# Patient Record
Sex: MALE | Race: WHITE
[De-identification: names, ages, dates, MRNs, and addresses within clinical notes are randomized per-mention and may not be internally consistent; named-entity substitution may affect disease eponyms.]

---

## 2017-06-11 ENCOUNTER — HOSPITAL ENCOUNTER (EMERGENCY)
Dept: HOSPITAL 47 - EC | Age: 22
Discharge: HOME | End: 2017-06-11
Payer: COMMERCIAL

## 2017-06-11 VITALS
RESPIRATION RATE: 18 BRPM | TEMPERATURE: 97.6 F | SYSTOLIC BLOOD PRESSURE: 133 MMHG | HEART RATE: 93 BPM | DIASTOLIC BLOOD PRESSURE: 81 MMHG

## 2017-06-11 DIAGNOSIS — F17.200: ICD-10-CM

## 2017-06-11 DIAGNOSIS — R07.89: Primary | ICD-10-CM

## 2017-06-11 PROCEDURE — 99285 EMERGENCY DEPT VISIT HI MDM: CPT

## 2017-06-11 PROCEDURE — 93005 ELECTROCARDIOGRAM TRACING: CPT

## 2017-06-11 PROCEDURE — 71020: CPT

## 2017-06-11 NOTE — ED
General Adult HPI





- General


Chief complaint: Chest Pain


Stated complaint: chest pain 3 days


Time Seen by Provider: 06/11/17 12:57


Source: patient, RN notes reviewed


Mode of arrival: ambulatory


Limitations: no limitations





- History of Present Illness


Initial comments: 





Patient is a 21-year-old male who presents emergency room today with a chief 

complaint of chest pain 3 days.  He doesn't that over the last 3 days she's 

been experiencing a constant "achy" type pain to the left side of the chest 

wall.  He states it's a 4/10.  He states it does seem to be worse certain 

movements at times the left shoulder.  He denies any other complaints.  Denies 

any injury or trauma.  States never had similar symptoms in the past. Patient 

denies any recent fever, chills, shortness of breath, back pain, abdominal pain

, nausea or vomiting, numbness or tingling, dysuria or hematuria, constipation 

or diarrhea, headaches or visual changes, or any other complaints.





- Related Data


 Previous Rx's











 Medication  Instructions  Recorded


 


Ibuprofen [Motrin] 600 mg PO Q6HR PRN #40 day 06/11/17











 Allergies











Allergy/AdvReac Type Severity Reaction Status Date / Time


 


No Known Allergies Allergy   Verified 06/11/17 13:11














Review of Systems


ROS Statement: 


Those systems with pertinent positive or pertinent negative responses have been 

documented in the HPI.





ROS Other: All systems not noted in ROS Statement are negative.





Past Medical History


Past Medical History: No Reported History


History of Any Multi-Drug Resistant Organisms: None Reported


Past Surgical History: No Surgical Hx Reported


Past Psychological History: No Psychological Hx Reported


Smoking Status: Current every day smoker


Past Alcohol Use History: None Reported


Past Drug Use History: Marijuana





General Exam





- General Exam Comments


Initial Comments: 





General:  The patient is awake and alert, in no distress, and does not appear 

acutely ill. 


Eye:  Pupils are equal, round and reactive to light, extra-ocular movements are 

intact.  No nystagmus.  There is normal conjunctiva bilaterally.  No signs of 

icterus.  


Ears, nose, mouth and throat:  There are moist mucous membranes and no oral 

lesions. 


Neck:  The neck is supple, there is no tenderness or JVD.  


Cardiovascular:  There is a regular rate and rhythm. No murmur, rub or gallop 

is appreciated.  Tender palpation to the left side of the anterior chest all 

towards the axilla. 


Respiratory:  Lungs are clear to auscultation, respirations are non-labored, 

breath sounds are equal.  No wheezes, stridor, rales, or rhonchi.


Gastrointestinal:  Soft, non-distended, non-tender abdomen without masses or 

organomegaly noted. There is no rebound or guarding present.  No CVA 

tenderness. Bowel sounds are unremarkable.


Musculoskeletal:  Normal ROM, no tenderness.  Strength 5/5. Sensation intact. 

Pulses equal bilaterally 2+.  


Neurological:  A&O x 3. CN II-XII intact, There are no obvious motor or sensory 

deficits. Coordination appears grossly intact. Speech is normal.


Skin:  Skin is warm and dry and no rashes or lesions are noted. 


Psychiatric:  Cooperative, appropriate mood & affect, normal judgment.  


Limitations: no limitations





Course


 Vital Signs











  06/11/17 06/11/17





  12:43 12:50


 


Temperature 97.6 F 


 


Pulse Rate 93 


 


Respiratory 18 18





Rate  


 


Blood Pressure 133/81 


 


O2 Sat by Pulse 99 





Oximetry  














EKG Findings





- EKG Comments:


EKG Findings:: EKG performed at 1252:  A 12-lead EKG was performed and 

interpreted by me as showing the following: Rate is 72, and rhythm is normal 

sinus. There are normal QRS complexes and normal R-wave progression. ST 

segments have no elevation or depression, and MN segments appear normal.





Medical Decision Making





- Medical Decision Making





Patient reexamined at this time shows no signs of distress.  Patient chest x-

ray reviewed and is negative for any acute abnormalities.  His EKG shows normal 

sinus rhythm.  His pain is on palpation anterior chest wall.  Patient is 

advised to limit his physical activity follow-up family doctor over the next 2 

days.  States does have family doctor is unsure of his name.  Will make an 

appointment.  Patient advised to use anti-inflammatories for pain.  Advised 

return to emergency room symptoms increase or worsen.  States and is in 

agreement.





Disposition


Clinical Impression: 


 Chest wall pain





Disposition: HOME SELF-CARE


Condition: Good


Instructions:  Costochondritis (ED)


Additional Instructions: 


Please use medication as discussed.  Please limit physical activity until follow

-up family doctor.  Please follow-up with family doctor in the next 2 days.  

Please return to emergency room if the symptoms increase or worsen or for any 

other concerns.


Prescriptions: 


Ibuprofen [Motrin] 600 mg PO Q6HR PRN #40 day


 PRN Reason: Pain


Referrals: 


None,Stated [Primary Care Provider] - 1-2 days


Time of Disposition: 13:46

## 2017-06-11 NOTE — XR
EXAMINATION TYPE: XR chest 2V

 

DATE OF EXAM: 6/11/2017

 

COMPARISON: NONE

 

HISTORY: Chest pain

 

TECHNIQUE:  Frontal and lateral views of the chest are obtained.

 

FINDINGS:  Heart and mediastinum are normal. Lungs are clear. Diaphragm is normal bony thorax appears
 normal.

 

IMPRESSION:  Normal chest

## 2017-06-28 ENCOUNTER — HOSPITAL ENCOUNTER (INPATIENT)
Dept: HOSPITAL 47 - EC | Age: 22
LOS: 1 days | Discharge: HOME | DRG: 194 | End: 2017-06-29
Attending: HOSPITALIST | Admitting: HOSPITALIST
Payer: COMMERCIAL

## 2017-06-28 DIAGNOSIS — R09.1: Primary | ICD-10-CM

## 2017-06-28 DIAGNOSIS — F12.90: ICD-10-CM

## 2017-06-28 DIAGNOSIS — J45.909: ICD-10-CM

## 2017-06-28 DIAGNOSIS — F17.210: ICD-10-CM

## 2017-06-28 DIAGNOSIS — I31.9: ICD-10-CM

## 2017-06-28 LAB
ALP SERPL-CCNC: 66 U/L (ref 38–126)
ALT SERPL-CCNC: 28 U/L (ref 21–72)
ANION GAP SERPL CALC-SCNC: 11 MMOL/L
AST SERPL-CCNC: 28 U/L (ref 17–59)
BASOPHILS # BLD AUTO: 0 K/UL (ref 0–0.2)
BASOPHILS NFR BLD AUTO: 0 %
BUN SERPL-SCNC: 13 MG/DL (ref 9–20)
CALCIUM SPEC-MCNC: 10.2 MG/DL (ref 8.4–10.2)
CH: 32.8
CHCM: 35
CHLORIDE SERPL-SCNC: 106 MMOL/L (ref 98–107)
CK SERPL-CCNC: 86 U/L (ref 55–170)
CO2 SERPL-SCNC: 27 MMOL/L (ref 22–30)
EOSINOPHIL # BLD AUTO: 0.1 K/UL (ref 0–0.7)
EOSINOPHIL NFR BLD AUTO: 2 %
ERYTHROCYTE [DISTWIDTH] IN BLOOD BY AUTOMATED COUNT: 5.01 M/UL (ref 4.3–5.9)
ERYTHROCYTE [DISTWIDTH] IN BLOOD: 12.5 % (ref 11.5–15.5)
GLUCOSE SERPL-MCNC: 76 MG/DL (ref 74–99)
HCT VFR BLD AUTO: 47.1 % (ref 39–53)
HDW: 2.35
HGB BLD-MCNC: 16.2 GM/DL (ref 13–17.5)
LUC NFR BLD AUTO: 2 %
LYMPHOCYTES # SPEC AUTO: 2.1 K/UL (ref 1–4.8)
LYMPHOCYTES NFR SPEC AUTO: 36 %
MCH RBC QN AUTO: 32.2 PG (ref 25–35)
MCHC RBC AUTO-ENTMCNC: 34.3 G/DL (ref 31–37)
MCV RBC AUTO: 93.9 FL (ref 80–100)
MONOCYTES # BLD AUTO: 0.4 K/UL (ref 0–1)
MONOCYTES NFR BLD AUTO: 6 %
NEUTROPHILS # BLD AUTO: 3.2 K/UL (ref 1.3–7.7)
NEUTROPHILS NFR BLD AUTO: 54 %
NON-AFRICAN AMERICAN GFR(MDRD): >60
POTASSIUM SERPL-SCNC: 4.3 MMOL/L (ref 3.5–5.1)
PROT SERPL-MCNC: 7.7 G/DL (ref 6.3–8.2)
SODIUM SERPL-SCNC: 144 MMOL/L (ref 137–145)
TROPONIN I SERPL-MCNC: <0.012 NG/ML (ref 0–0.03)
TROPONIN I SERPL-MCNC: <0.012 NG/ML (ref 0–0.03)
WBC # BLD AUTO: 0.12 10*3/UL
WBC # BLD AUTO: 5.9 K/UL (ref 4–11)
WBC (PEROX): 5.41

## 2017-06-28 PROCEDURE — 99285 EMERGENCY DEPT VISIT HI MDM: CPT

## 2017-06-28 PROCEDURE — 93005 ELECTROCARDIOGRAM TRACING: CPT

## 2017-06-28 PROCEDURE — 36415 COLL VENOUS BLD VENIPUNCTURE: CPT

## 2017-06-28 PROCEDURE — 71020: CPT

## 2017-06-28 PROCEDURE — 85025 COMPLETE CBC W/AUTO DIFF WBC: CPT

## 2017-06-28 PROCEDURE — 84484 ASSAY OF TROPONIN QUANT: CPT

## 2017-06-28 PROCEDURE — 80053 COMPREHEN METABOLIC PANEL: CPT

## 2017-06-28 PROCEDURE — 93306 TTE W/DOPPLER COMPLETE: CPT

## 2017-06-28 PROCEDURE — 85652 RBC SED RATE AUTOMATED: CPT

## 2017-06-28 PROCEDURE — 82553 CREATINE MB FRACTION: CPT

## 2017-06-28 PROCEDURE — 82550 ASSAY OF CK (CPK): CPT

## 2017-06-28 PROCEDURE — 86140 C-REACTIVE PROTEIN: CPT

## 2017-06-28 PROCEDURE — 85379 FIBRIN DEGRADATION QUANT: CPT

## 2017-06-28 RX ADMIN — CEFAZOLIN SCH: 330 INJECTION, POWDER, FOR SOLUTION INTRAMUSCULAR; INTRAVENOUS at 17:47

## 2017-06-28 NOTE — XR
EXAMINATION TYPE: XR chest 2V

 

DATE OF EXAM: 6/28/2017

 

COMPARISON: 6/11/17

 

HISTORY: Chest pain

 

TECHNIQUE:  Frontal and lateral views of the chest are obtained.

 

FINDINGS:  

 

There is no focal air space opacity.

 

No evidence for pneumothorax.  No pleural effusion.

 

The cardiac silhouette size is within normal limits.

 

The osseous structures are grossly intact.

 

IMPRESSION:  

 

1.  No acute cardiopulmonary process.

## 2017-06-28 NOTE — ED
General Adult HPI





- General


Source: patient, RN notes reviewed


Mode of arrival: ambulatory


Limitations: no limitations





<Elin Stapleton - Last Filed: 06/28/17 16:20>





<Jay Villalta - Last Filed: 06/28/17 16:26>





- General


Chief complaint: Chest Pain


Stated complaint: Chest Pain


Time Seen by Provider: 06/28/17 14:08





- History of Present Illness


Initial comments: 





20 yo male presents to the ER with cc of chest pain to the left side of the 

chest.  Patient states stabbing type pain in his head on off the past 3 weeks.  

Patient states that he does smoke meat as well as he does use cigarettes.  

Patient states that he takes a deep breath the pain is worse.  Patient states 

if he tries to think that the pain seems to improve.  Patient states he hasn't 

had any increased shortness of breath with this.  Patient denies any cough cold 

like symptoms.  Patient states he was concerned due to the pains without that 

he should be seen again.  He was seen a few weeks ago here and he told if it 

got worse to return if he states that he believes it got worse.Patient denies 

any recent fever, chills, back pain, abdominal pain, nausea vomiting, numbness 

or tingling, dysuria or hematuria, constipation or diarrhea, headaches or 

visual changes, or any other current symptoms. (Elin Stapleton)





- Related Data


 Home Medications











 Medication  Instructions  Recorded  Confirmed


 


No Known Home Medications [No  06/28/17 06/28/17





Known Home Medications]   











 Allergies











Allergy/AdvReac Type Severity Reaction Status Date / Time


 


No Known Allergies Allergy   Verified 06/28/17 14:49














Review of Systems


ROS Other: All systems not noted in ROS Statement are negative.





<Elin Stapleton - Last Filed: 06/28/17 16:20>


ROS Other: All systems not noted in ROS Statement are negative.





<Jay Villalta - Last Filed: 06/28/17 16:26>


ROS Statement: 


Those systems with pertinent positive or pertinent negative responses have been 

documented in the HPI.








Past Medical History


Past Medical History: Asthma


History of Any Multi-Drug Resistant Organisms: None Reported


Past Surgical History: No Surgical Hx Reported


Past Psychological History: No Psychological Hx Reported


Smoking Status: Current every day smoker


Past Alcohol Use History: None Reported, Occasional


Past Drug Use History: Marijuana





<Elin Stapleton - Last Filed: 06/28/17 16:20>





General Exam


Limitations: no limitations





<Elin Stapleton - Last Filed: 06/28/17 16:20>





<Jay Villalta - Last Filed: 06/28/17 16:26>





- General Exam Comments


Initial Comments: 





General:  The patient is awake and alert, in no distress, and does not appear 

acutely ill. 


Eye:  Pupils are equal, round and reactive to light, extra-ocular movements are 

intact; there is normal conjunctiva bilaterally.  No signs of icterus.  


Ears, nose, mouth and throat:  There are moist mucous membranes and no oral 

lesions. 


Neck:  The neck is supple, there is no tenderness.


Cardiovascular:  There is a regular rate and rhythm. No murmur, rub or gallop 

is appreciated.


Respiratory:  Lungs are clear to auscultation, respirations are non-labored, 

breath sounds are equal.  No wheezes, stridor, rales, or rhonchi.


Gastrointestinal:  Soft, non-distended, non-tender abdomen without masses or 

organomegaly noted. There is no rebound or guarding present.  No CVA 

tenderness. Bowel sounds are unremarkable.


Back:  There is no tenderness to palpation in the midline. There is no obvious 

deformity. No rashes noted. 


Musculoskeletal:  Normal ROM, no tenderness, There is no pedal edema. There is 

no calf tenderness or swelling. Sensation intact. Pulses equal bilaterally 2+.  


Neurological:  CN II-XII intact, There are no obvious motor or sensory 

deficits. Coordination appears grossly intact. Speech is normal.


Skin:  Skin is warm and dry and no rashes or lesions are noted. 


Psychiatric:  Cooperative, appropriate mood & affect, normal judgment.  


 (Elin Stapleton)





EKG Findings





- EKG Comments:


EKG Findings:: Markedly sinus bradycardia, ventricular rate 47, rightward axis, 

mild ST elevation noted diffusely throughout.  Concern for possible pericarditis





<Elin Stapleton - Last Filed: 06/28/17 16:20>





Medical Decision Making





- Lab Data


Result diagrams: 


 06/28/17 14:20





 06/28/17 14:20





- Radiology Data


Radiology results: report reviewed, image reviewed





<Elin Stapleton - Last Filed: 06/28/17 16:20>





- Lab Data


Result diagrams: 


 06/28/17 14:20





 06/28/17 14:20





<Jay Villalta - Last Filed: 06/28/17 16:26>





- Medical Decision Making





21-year-old male presents for left-sided chest pain that is pleuritic in 

nature.  This time lab work and imaging is reviewed.  This was concerned due to 

some elevation of the ST segments throughout the EKG when compared to EKG from 

1 week ago of concern for pericarditis.  Lab work is negative at this time.  

This time we will admit the patient to Dr. James who Dr. Villalta discussed the case 

with an order an echo cardiogram.  The patient is in agreement with the plan. (

AyadElin potter)





Is a 21-year-old male with complaint chest discomfort on again off again for a 

week.  EKG shows small mildly elevated ST segment of multiple leads suggestive 

of pericarditis.  Cardiac enzymes within normal limits.  Chest x-ray negative.  

The patient will be admitted for observation cardiology consultation 

echocardiogram ,  Dr. Villalta (Jay Villalta)





- Lab Data





 Lab Results











  06/28/17 06/28/17 06/28/17 Range/Units





  14:20 14:20 14:20 


 


WBC  5.9    (4.0-11.0)  k/uL


 


RBC  5.01    (4.30-5.90)  m/uL


 


Hgb  16.2    (13.0-17.5)  gm/dL


 


Hct  47.1    (39.0-53.0)  %


 


MCV  93.9    (80.0-100.0)  fL


 


MCH  32.2    (25.0-35.0)  pg


 


MCHC  34.3    (31.0-37.0)  g/dL


 


RDW  12.5    (11.5-15.5)  %


 


Plt Count  199    (150-450)  k/uL


 


Neutrophils %  54    %


 


Lymphocytes %  36    %


 


Monocytes %  6    %


 


Eosinophils %  2    %


 


Basophils %  0    %


 


Neutrophils #  3.2    (1.3-7.7)  k/uL


 


Lymphocytes #  2.1    (1.0-4.8)  k/uL


 


Monocytes #  0.4    (0-1.0)  k/uL


 


Eosinophils #  0.1    (0-0.7)  k/uL


 


Basophils #  0.0    (0-0.2)  k/uL


 


ESR     (0-15)  mm/hr


 


D-Dimer    <0.17  (<0.60)  mg/L FEU


 


Sodium   144   (137-145)  mmol/L


 


Potassium   4.3   (3.5-5.1)  mmol/L


 


Chloride   106   ()  mmol/L


 


Carbon Dioxide   27   (22-30)  mmol/L


 


Anion Gap   11   mmol/L


 


BUN   13   (9-20)  mg/dL


 


Creatinine   0.90   (0.66-1.25)  mg/dL


 


Est GFR (MDRD) Af Amer   >60   (>60 ml/min/1.73 sqM)  


 


Est GFR (MDRD) Non-Af   >60   (>60 ml/min/1.73 sqM)  


 


Glucose   76   (74-99)  mg/dL


 


Calcium   10.2   (8.4-10.2)  mg/dL


 


Total Bilirubin   0.7   (0.2-1.3)  mg/dL


 


AST   28   (17-59)  U/L


 


ALT   28   (21-72)  U/L


 


Alkaline Phosphatase   66   ()  U/L


 


Creatine Kinase     ()  U/L


 


CK-MB (CK-2)     (0.0-2.4)  ng/mL


 


Troponin I     (0.000-0.034)  ng/mL


 


C-Reactive Protein     (<10.0)  mg/L


 


Total Protein   7.7   (6.3-8.2)  g/dL


 


Albumin   5.0   (3.5-5.0)  g/dL














  06/28/17 06/28/17 06/28/17 Range/Units





  14:20 14:20 14:20 


 


WBC     (4.0-11.0)  k/uL


 


RBC     (4.30-5.90)  m/uL


 


Hgb     (13.0-17.5)  gm/dL


 


Hct     (39.0-53.0)  %


 


MCV     (80.0-100.0)  fL


 


MCH     (25.0-35.0)  pg


 


MCHC     (31.0-37.0)  g/dL


 


RDW     (11.5-15.5)  %


 


Plt Count     (150-450)  k/uL


 


Neutrophils %     %


 


Lymphocytes %     %


 


Monocytes %     %


 


Eosinophils %     %


 


Basophils %     %


 


Neutrophils #     (1.3-7.7)  k/uL


 


Lymphocytes #     (1.0-4.8)  k/uL


 


Monocytes #     (0-1.0)  k/uL


 


Eosinophils #     (0-0.7)  k/uL


 


Basophils #     (0-0.2)  k/uL


 


ESR    2  (0-15)  mm/hr


 


D-Dimer     (<0.60)  mg/L FEU


 


Sodium     (137-145)  mmol/L


 


Potassium     (3.5-5.1)  mmol/L


 


Chloride     ()  mmol/L


 


Carbon Dioxide     (22-30)  mmol/L


 


Anion Gap     mmol/L


 


BUN     (9-20)  mg/dL


 


Creatinine     (0.66-1.25)  mg/dL


 


Est GFR (MDRD) Af Amer     (>60 ml/min/1.73 sqM)  


 


Est GFR (MDRD) Non-Af     (>60 ml/min/1.73 sqM)  


 


Glucose     (74-99)  mg/dL


 


Calcium     (8.4-10.2)  mg/dL


 


Total Bilirubin     (0.2-1.3)  mg/dL


 


AST     (17-59)  U/L


 


ALT     (21-72)  U/L


 


Alkaline Phosphatase     ()  U/L


 


Creatine Kinase     ()  U/L


 


CK-MB (CK-2)   1.5   (0.0-2.4)  ng/mL


 


Troponin I   <0.012   (0.000-0.034)  ng/mL


 


C-Reactive Protein  <5.0    (<10.0)  mg/L


 


Total Protein     (6.3-8.2)  g/dL


 


Albumin     (3.5-5.0)  g/dL














  06/28/17 Range/Units





  14:20 


 


WBC   (4.0-11.0)  k/uL


 


RBC   (4.30-5.90)  m/uL


 


Hgb   (13.0-17.5)  gm/dL


 


Hct   (39.0-53.0)  %


 


MCV   (80.0-100.0)  fL


 


MCH   (25.0-35.0)  pg


 


MCHC   (31.0-37.0)  g/dL


 


RDW   (11.5-15.5)  %


 


Plt Count   (150-450)  k/uL


 


Neutrophils %   %


 


Lymphocytes %   %


 


Monocytes %   %


 


Eosinophils %   %


 


Basophils %   %


 


Neutrophils #   (1.3-7.7)  k/uL


 


Lymphocytes #   (1.0-4.8)  k/uL


 


Monocytes #   (0-1.0)  k/uL


 


Eosinophils #   (0-0.7)  k/uL


 


Basophils #   (0-0.2)  k/uL


 


ESR   (0-15)  mm/hr


 


D-Dimer   (<0.60)  mg/L FEU


 


Sodium   (137-145)  mmol/L


 


Potassium   (3.5-5.1)  mmol/L


 


Chloride   ()  mmol/L


 


Carbon Dioxide   (22-30)  mmol/L


 


Anion Gap   mmol/L


 


BUN   (9-20)  mg/dL


 


Creatinine   (0.66-1.25)  mg/dL


 


Est GFR (MDRD) Af Amer   (>60 ml/min/1.73 sqM)  


 


Est GFR (MDRD) Non-Af   (>60 ml/min/1.73 sqM)  


 


Glucose   (74-99)  mg/dL


 


Calcium   (8.4-10.2)  mg/dL


 


Total Bilirubin   (0.2-1.3)  mg/dL


 


AST   (17-59)  U/L


 


ALT   (21-72)  U/L


 


Alkaline Phosphatase   ()  U/L


 


Creatine Kinase  107  ()  U/L


 


CK-MB (CK-2)   (0.0-2.4)  ng/mL


 


Troponin I   (0.000-0.034)  ng/mL


 


C-Reactive Protein   (<10.0)  mg/L


 


Total Protein   (6.3-8.2)  g/dL


 


Albumin   (3.5-5.0)  g/dL














Disposition


Time of Disposition: 16:21


Decision Date: 06/28/17


Decision Time: 16:21





<Elin Stapleton - Last Filed: 06/28/17 16:20>





<Jay Villalta - Last Filed: 06/28/17 16:26>


Clinical Impression: 


 Pericarditis, Chest wall pain





Disposition: ADMITTED AS IP TO THIS [facility]


Condition: Stable


Referrals: 


Nikhil Ervin MD [Primary Care Provider] - 1-2 days

## 2017-06-29 VITALS
SYSTOLIC BLOOD PRESSURE: 110 MMHG | RESPIRATION RATE: 18 BRPM | HEART RATE: 55 BPM | DIASTOLIC BLOOD PRESSURE: 68 MMHG | TEMPERATURE: 97.3 F

## 2017-06-29 LAB
ALP SERPL-CCNC: 62 U/L (ref 38–126)
ALT SERPL-CCNC: 34 U/L (ref 21–72)
ANION GAP SERPL CALC-SCNC: 15 MMOL/L
AST SERPL-CCNC: 25 U/L (ref 17–59)
BASOPHILS # BLD AUTO: 0 K/UL (ref 0–0.2)
BASOPHILS NFR BLD AUTO: 0 %
BUN SERPL-SCNC: 13 MG/DL (ref 9–20)
CALCIUM SPEC-MCNC: 9.8 MG/DL (ref 8.4–10.2)
CH: 32.1
CHCM: 33.6
CHLORIDE SERPL-SCNC: 106 MMOL/L (ref 98–107)
CK SERPL-CCNC: 83 U/L (ref 55–170)
CO2 SERPL-SCNC: 23 MMOL/L (ref 22–30)
EOSINOPHIL # BLD AUTO: 0.2 K/UL (ref 0–0.7)
EOSINOPHIL NFR BLD AUTO: 3 %
ERYTHROCYTE [DISTWIDTH] IN BLOOD BY AUTOMATED COUNT: 4.75 M/UL (ref 4.3–5.9)
ERYTHROCYTE [DISTWIDTH] IN BLOOD: 12.3 % (ref 11.5–15.5)
GLUCOSE SERPL-MCNC: 93 MG/DL (ref 74–99)
HCT VFR BLD AUTO: 45.5 % (ref 39–53)
HDW: 2.32
HGB BLD-MCNC: 15.6 GM/DL (ref 13–17.5)
LUC NFR BLD AUTO: 3 %
LYMPHOCYTES # SPEC AUTO: 3.2 K/UL (ref 1–4.8)
LYMPHOCYTES NFR SPEC AUTO: 41 %
MCH RBC QN AUTO: 33 PG (ref 25–35)
MCHC RBC AUTO-ENTMCNC: 34.4 G/DL (ref 31–37)
MCV RBC AUTO: 95.8 FL (ref 80–100)
MONOCYTES # BLD AUTO: 0.4 K/UL (ref 0–1)
MONOCYTES NFR BLD AUTO: 5 %
NEUTROPHILS # BLD AUTO: 3.6 K/UL (ref 1.3–7.7)
NEUTROPHILS NFR BLD AUTO: 47 %
NON-AFRICAN AMERICAN GFR(MDRD): >60
POTASSIUM SERPL-SCNC: 3.9 MMOL/L (ref 3.5–5.1)
PROT SERPL-MCNC: 7.1 G/DL (ref 6.3–8.2)
SODIUM SERPL-SCNC: 144 MMOL/L (ref 137–145)
TROPONIN I SERPL-MCNC: <0.012 NG/ML (ref 0–0.03)
WBC # BLD AUTO: 0.23 10*3/UL
WBC # BLD AUTO: 7.7 K/UL (ref 4–11)
WBC (PEROX): 7.82

## 2017-06-29 RX ADMIN — CEFAZOLIN SCH: 330 INJECTION, POWDER, FOR SOLUTION INTRAMUSCULAR; INTRAVENOUS at 06:36

## 2017-06-29 NOTE — P.CRDCN
History of Present Illness


Consult date: 06/29/17


Requesting physician: Nathan Lopez


Consult reason: chest pain


Chief complaint: Chest pain


History of present illness: 


This is a pleasant 21-year-old  male with history of nicotine 

dependence, he states he smokes one pack of cigarettes per day, smokes 

marijuana daily, no other drug use, no family history of premature coronary 

artery disease or sudden cardiac death, denies hypertension, no diabetes, no 

hyperlipidemia.  He states he's been experiencing chest pain which she 

describes as a pressure in the chest feels like someone is pressing down on his 

chest, he has been experiencing this off and on for the past 2 weeks.  He 

denies any recent viral illness or fever.  EKG on arrival here showed a normal 

sinus with diffuse concave ST elevation,   Chest x-ray does not reveal any 

acute cardiopulmonary process.  CBC normal, d-dimer negative.  Potassium 3.9, 

BUN 13, creatinine 0.9.  Troponins have been negative 3.  C-reactive protein 

less than 5.  At the time of my examination this morning, patient denies any 

chest pain.  He was started on colchicine in the emergency room yesterday for 

suspicion of pericarditis.








Past Medical History


Past Medical History: Asthma


History of Any Multi-Drug Resistant Organisms: None Reported


Past Surgical History: No Surgical Hx Reported


Past Anesthesia/Blood Transfusion Reactions: No Reported Reaction


Smoking Status: Current every day smoker





- Past Family History


  ** Father


Additional Family Medical History / Comment(s): hip surgery





Medications and Allergies


 Home Medications











 Medication  Instructions  Recorded  Confirmed  Type


 


No Known Home Medications [No  06/28/17 06/28/17 History





Known Home Medications]    











 Allergies











Allergy/AdvReac Type Severity Reaction Status Date / Time


 


No Known Allergies Allergy   Verified 06/28/17 14:49














Physical Exam


Vitals: 


 Vital Signs











  Temp Pulse Pulse Resp BP BP Pulse Ox


 


 06/29/17 04:00  97.6 F   50 L  16   97/52  100


 


 06/28/17 23:36    61  16   


 


 06/28/17 23:32  97.4 F L   61  16   102/55  98


 


 06/28/17 20:00  97.1 F L   53 L  16   110/63  99


 


 06/28/17 17:00  97.1 F L   46 L  20   109/63  100


 


 06/28/17 16:20  98.2 F  53 L   18  108/68   100


 


 06/28/17 15:23  97.8 F  52 L   18  106/65   99


 


 06/28/17 14:11     18   


 


 06/28/17 13:56  98.3 F  82   18  128/73   100








 Intake and Output











 06/28/17 06/29/17 06/29/17





 22:59 06:59 14:59


 


Intake Total  0 


 


Balance  0 


 


Intake:   


 


  Intake, IV Titration  0 





  Amount   


 


    Sodium Chloride 0.9% 1,  0 





    000 ml @ 80 mls/hr IV .   





    [id number] JESÚS Rx#:[id number]   


 


Other:   


 


  # Voids  1 


 


  Weight  61.1 kg 














PHYSICAL EXAMINATION: 





HEENT: Head is atraumatic, normocephalic.  Pupils equal, round.  Neck is 

supple.  There is no elevated jugular venous pressure.





HEART EXAMINATION: Heart S1, S2 normal.  No murmur or gallop heard.





CHEST EXAMINATION: Lungs are clear to auscultation and precussion. No chest 

wall tenderness is noted on palpation or with deep breathing.





ABDOMEN:  Soft, nontender. Bowel sounds are heard. No organomegaly noted.


 


EXTREMITIES: 2+ peripheral pulses with no evidence of peripheral edema and no 

calf tenderness noted.





NEUROLOGIC patient is awake, alert and oriented -3.


 


.


 








Results





 06/29/17 01:58





 06/29/17 01:58


 Cardiac Enzymes











  06/28/17 06/28/17 06/28/17 Range/Units





  14:20 14:20 19:46 


 


AST  28    (17-59)  U/L


 


CK-MB (CK-2)   1.5  1.1  (0.0-2.4)  ng/mL


 


Troponin I   <0.012  <0.012  (0.000-0.034)  ng/mL














  06/29/17 06/29/17 Range/Units





  01:58 01:58 


 


AST   25  (17-59)  U/L


 


CK-MB (CK-2)  1.0   (0.0-2.4)  ng/mL


 


Troponin I  <0.012   (0.000-0.034)  ng/mL








 CBC











  06/28/17 06/29/17 Range/Units





  14:20 01:58 


 


WBC  5.9  7.7  (4.0-11.0)  k/uL


 


RBC  5.01  4.75  (4.30-5.90)  m/uL


 


Hgb  16.2  15.6  (13.0-17.5)  gm/dL


 


Hct  47.1  45.5  (39.0-53.0)  %


 


Plt Count  199  194  (150-450)  k/uL








 Comprehensive Metabolic Panel











  06/28/17 06/29/17 Range/Units





  14:20 01:58 


 


Sodium  144  144  (137-145)  mmol/L


 


Potassium  4.3  3.9  (3.5-5.1)  mmol/L


 


Chloride  106  106  ()  mmol/L


 


Carbon Dioxide  27  23  (22-30)  mmol/L


 


BUN  13  13  (9-20)  mg/dL


 


Creatinine  0.90  0.90  (0.66-1.25)  mg/dL


 


Glucose  76  93  (74-99)  mg/dL


 


Calcium  10.2  9.8  (8.4-10.2)  mg/dL


 


AST  28  25  (17-59)  U/L


 


ALT  28  34  (21-72)  U/L


 


Alkaline Phosphatase  66  62  ()  U/L


 


Total Protein  7.7  7.1  (6.3-8.2)  g/dL


 


Albumin  5.0  4.7  (3.5-5.0)  g/dL








 Current Medications











Generic Name Dose Route Start Last Admin





  Trade Name Freq  PRN Reason Stop Dose Admin


 


Aspirin  650 mg  06/29/17 09:00  





  Aspirin  PO   





  BID JESÚS   


 


Colchicine  0.6 mg  06/29/17 09:00  





  Colcrys  PO   





  DAILY JESÚS   


 


Sodium Chloride  1,000 mls @ 80 mls/hr  06/28/17 16:30  06/29/17 06:36





  Saline 0.9%  IV   Not Given





  [id number] JESÚS   


 


Naloxone HCl  0.2 mg  06/28/17 16:21  





  Narcan  IV   





  Q2M PRN   





  Opioid Reversal   








 Intake and Output











 06/28/17 06/29/17 06/29/17





 22:59 06:59 14:59


 


Intake Total  0 


 


Balance  0 


 


Intake:   


 


  Intake, IV Titration  0 





  Amount   


 


    Sodium Chloride 0.9% 1,  0 





    000 ml @ 80 mls/hr IV .   





    [id number] JESÚS Rx#:[id number]   


 


Other:   


 


  # Voids  1 


 


  Weight  61.1 kg 








 





 06/29/17 01:58 





 06/29/17 01:58 











EKG Interpretations (text)


EKG shows diffuse concave upward ST elevation





Assessment and Plan


Plan: 


Assessment and plan


#1 chest pain atypical for acute coronary syndrome, possible pleurisy.


#2 nicotine dependence


#3 daily marijuana use








Plan


We will obtain a sed rate, echocardiogram with Doppler study.  Patient has been 

initiated on Colchicine, which we will discontinue.  We will add nonsteroidal 

anti-inflammatories his medication regime.    Further recommendations to 

follow.  Patient has been advised regarding the importance of nicotine 

cessation.





DNP note has been reviewed, I agree with a documented findings and plan of 

care.  Patient was seen and examined.

## 2017-06-30 NOTE — ECHOF
Referral Reason:concern for pericarditis



MEASUREMENTS

--------

HEIGHT: 177.8 cm

WEIGHT: 60.8 kg

BP: 

RVIDd:   1.9 cm     (< 3.3)

IVSd:   0.8 cm     (0.6 - 1.1)

LVIDd:   4.6 cm     (3.9 - 5.3)

LVPWd:   0.8 cm     (0.6 - 1.1)

IVSs:   1.2 cm

LVIDs:   2.8 cm

LVPWs:   1.5 cm

LAESV Index (A-L):   23.35 ml/m

Ao Diam:   2.7 cm     (2.0 - 3.7)

AV Cusp:   2.3 cm     (1.5 - 2.6)

LA Diam:   2.2 cm     (2.7 - 3.8)

MV EXCURSION:   19.262 mm     (> 18.000)

MV EF SLOPE:   164 mm/s     (70 - 150)

EPSS:   1.5 cm

MV E Terrell:   0.73 m/s

MV DecT:   388 ms

MV A Terrell:   0.32 m/s

MV E/A Ratio:   2.25 

RAP:   5.00 mmHg

RVSP:   9.20 mmHg







FINDINGS

--------

Resting bradycardia (HR<60bpm).

This was a technically good study.

Overall left ventricular systolic function is normal 

with, an EF between 60 - 65 %.

The right ventricle is normal in size and function.

Normal LA  size by volume 22+/-6 ml/m2.

The right atrium is normal in size.

The aortic valve is trileaflet, and appears 

structurally normal. No aortic stenosis or 

regurgitation.

The mitral valve leaflets are mildly thickened.   There 

is trace mitral regurgitation.

Trace tricuspid regurgitation present.   There is no 

evidence of pulmonary hypertension.   The right 

ventricular systolic pressure, as measured by Doppler, 

is 9.20mmHg.

Pulmonic valve appears structurally normal.

The aortic root size is normal.

Normal inferior vena cava with normal inspiratory 

collapse consistent with estimated right atrial 

pressure of  5 mmHg.

The pericardium is normal.   There is no pericardial 

effusion.



CONCLUSIONS

--------

1. Resting bradycardia (HR<60bpm).

2. The right ventricular systolic pressure, as measured by 

Doppler, is 9.20mmHg.

3. The aortic root size is normal.

4. There is no pericardial effusion.

5. This was a technically good study.

6. Overall left ventricular systolic function is normal 

with, an EF between 60 - 65 %.

7. Normal LA size by volume 22+/-6 ml/m2.

8. The aortic valve is trileaflet, and appears 

structurally normal. No aortic stenosis or 

regurgitation.

9. The mitral valve leaflets are mildly thickened.

10. There is trace mitral regurgitation.

11. Trace tricuspid regurgitation present.

12. There is no evidence of pulmonary hypertension.





SONOGRAPHER: Jhoan Orlando RDCS

## 2017-07-02 NOTE — HP
This dictation is both H&P and discharge summary. 



The patient is a 21-year-old who came in with complaints of chest pain and 
shortness of breath and decreased exercise performance. Patient does not have 
any family history of sudden cardiac death and patient has on and off sharp 
chest pains changes with position. Patient's chest x-ray is essentially within 
normal limits. Patient's chest pain is nonpleuritic in nature, not associated 
with food. Patient was evaluated by Cardiology and please refer to their 
dictation for further details. The patient has diffuse concave ST elevation and 
patient denied any recent viral illness or fever. Patient was diagnosed with 
pericarditis and patient was started on NSAIDs. Colchicine was discontinued. 
Patient is clinically doing well. Will be discharged on 10 days of NSAIDs. 
Patient wanted to be discharged. The patient will be discharged on 10 days of 
NSAIDs to follow with primary care physician and Cardiology as an outpatient. 
Patient's C-reactive protein is less than 5. Troponins are negative. Creatinine 
is 0.9



REVIEW OF SYSTEMS: (     ) 

CARDIOVASCULAR:  As described in HPI. 



PAST MEDICAL HISTORY: Significant for asthma. Patient does smoke. Occasional 
use of marijuana.  



FAMILY HISTORY: Father had some kind of hip surgery.  Denied any premature 
coronary artery disease in the family. 



HOME MEDICATIONS: None. 



ALLERGIES: No known drug allergies. 



PHYSICAL EXAMINATION:  Temperature 97.5, pulse 50, respiratory rate of 16, 
blood pressure 97/52, saturating at 100% on room air. 

(     ) 



LABORATORY DATA: CBC and CMP essentially within normal limits. Troponins are 
negative. Chest x-ray negative. D-dimer is less than 0.17. 



ASSESSMENT AND PLAN: 

1.  Chest pain with diffuse ST-T wave changes, ST elevations. Consistent with 
pericarditis. Patient will be discharged on naproxen for 10 days to follow with 
primary care physician in about a week. 

2.  Nicotine abuse. Extensive counseling was provided. 

3.  Marijuana use. Counseling was provided.

4.  If patient has any active reflux symptoms the patient was asked to take over
-the-counter ranitidine or if he has any acid reflux symptoms from naproxen. 

5.  Mild sinus bradycardia and low normal blood pressures are normal for his 
age. 



This dictation is both H&P and discharge summary. 

[facility]

## 2017-09-10 ENCOUNTER — HOSPITAL ENCOUNTER (EMERGENCY)
Dept: HOSPITAL 47 - EC | Age: 22
Discharge: HOME | End: 2017-09-10
Payer: COMMERCIAL

## 2017-09-10 VITALS
SYSTOLIC BLOOD PRESSURE: 131 MMHG | DIASTOLIC BLOOD PRESSURE: 66 MMHG | RESPIRATION RATE: 20 BRPM | TEMPERATURE: 98.1 F | HEART RATE: 98 BPM

## 2017-09-10 DIAGNOSIS — F17.200: ICD-10-CM

## 2017-09-10 DIAGNOSIS — F41.0: ICD-10-CM

## 2017-09-10 DIAGNOSIS — R00.2: Primary | ICD-10-CM

## 2017-09-10 DIAGNOSIS — R07.9: ICD-10-CM

## 2017-09-10 PROCEDURE — 99284 EMERGENCY DEPT VISIT MOD MDM: CPT

## 2017-09-10 PROCEDURE — 93005 ELECTROCARDIOGRAM TRACING: CPT

## 2017-09-10 NOTE — ED
General Adult HPI





- General


Chief complaint: Chest Pain


Stated complaint: Chest Pain


Time Seen by Provider: 09/10/17 02:50


Source: patient, RN notes reviewed


Mode of arrival: ambulatory


Limitations: no limitations





- History of Present Illness


Initial comments: 





This a 22-year-old male presents emergency Department chief complaint 

palpitations.  Patient states he has intermittent episodes where he feels like 

his heart is racing superfast.  He states it makes him uncomfortable.  He 

states after this was happening he becomes very worried that something was 

wrong and starts having anxiety and panic attacks.  Patient was admitted 

approximately 2 and half months ago for chest pain in which she had a complete 

cardiac workup including echocardiogram, cardiology consult, repeat troponin, 

EKGs.  Patient had no acute findings.  At that time concerned about possible 

pericarditis but had no evidence of this.  Patient states he is a smoker does 

smoke marijuana but denies any other illicit drug use.  Patient states that he 

states is very anxious at this time and cannot sleep streaking the emergency 

department.  Patient has a history of cardiac issues denies any hypertension or 

hyperlipidemia.





- Related Data


 Previous Rx's











 Medication  Instructions  Recorded


 


ALPRAZolam [Xanax] 0.25 mg PO DAILY PRN #10 tab 09/10/17











 Allergies











Allergy/AdvReac Type Severity Reaction Status Date / Time


 


No Known Allergies Allergy   Verified 06/28/17 14:49














Review of Systems


ROS Statement: 


Those systems with pertinent positive or pertinent negative responses have been 

documented in the HPI.





ROS Other: All systems not noted in ROS Statement are negative.





Past Medical History


Past Medical History: Asthma


History of Any Multi-Drug Resistant Organisms: None Reported


Past Surgical History: No Surgical Hx Reported


Past Anesthesia/Blood Transfusion Reactions: No Reported Reaction


Past Psychological History: No Psychological Hx Reported


Smoking Status: Current every day smoker


Past Alcohol Use History: Occasional


Past Drug Use History: Marijuana





- Past Family History


  ** Father


Additional Family Medical History / Comment(s): hip surgery





General Exam


Limitations: no limitations


General appearance: alert, in no apparent distress, anxious


Head exam: Present: atraumatic, normocephalic, normal inspection


Eye exam: Present: normal appearance, PERRL, EOMI.  Absent: scleral icterus, 

conjunctival injection, periorbital swelling


ENT exam: Present: normal exam, normal oropharynx, mucous membranes moist


Neck exam: Present: normal inspection.  Absent: tenderness, meningismus, 

lymphadenopathy


Respiratory exam: Present: normal lung sounds bilaterally.  Absent: respiratory 

distress, wheezes, rales, rhonchi, stridor


Cardiovascular Exam: Present: regular rate, normal rhythm, normal heart sounds.

  Absent: systolic murmur, diastolic murmur, rubs, gallop, clicks


Neurological exam: Present: alert, oriented X3, CN II-XII intact


Psychiatric exam: Present: anxious


Skin exam: Present: warm, dry, intact, normal color.  Absent: rash





Course





 Vital Signs











  09/10/17





  02:40


 


Temperature 98.1 F


 


Pulse Rate 98


 


Respiratory 20





Rate 


 


Blood Pressure 131/66


 


O2 Sat by Pulse 96





Oximetry 














EKG Findings





- EKG Comments:


EKG Findings:: EKG performed at 2:48 normal sinus rhythm with a rate of 83 HI 

interval 148 QRS duration 94 QT//408





Medical Decision Making





- Medical Decision Making





22-year-old male presented for palpitations.  Patient's had a workup with 

cardiology which included echo and EKGs troponin.  Patient is complaining of on 

and off symptoms which makes him anxious.  He states that he needs something 

for anxiety.  Patient we given Xanax only a short prescription and follow-up 

with Dr. Eugene.  Return parameters were discussed.





Disposition


Clinical Impression: 


 Palpitations, Anxiety





Disposition: HOME SELF-CARE


Condition: Stable


Instructions:  Palpitations (ED)


Additional Instructions: 


Please return to the Emergency Department if symptoms worsen or any other 

concerns.


Prescriptions: 


ALPRAZolam [Xanax] 0.25 mg PO DAILY PRN #10 tab


 PRN Reason: Anxiety


Referrals: 


Ross Oates Jr, DO [Primary Care Provider] - 1-2 days


Time of Disposition: 03:02

## 2018-06-04 ENCOUNTER — HOSPITAL ENCOUNTER (EMERGENCY)
Dept: HOSPITAL 47 - EC | Age: 23
Discharge: HOME | End: 2018-06-04
Payer: COMMERCIAL

## 2018-06-04 VITALS
TEMPERATURE: 98.7 F | HEART RATE: 59 BPM | RESPIRATION RATE: 18 BRPM | DIASTOLIC BLOOD PRESSURE: 64 MMHG | SYSTOLIC BLOOD PRESSURE: 113 MMHG

## 2018-06-04 DIAGNOSIS — Z79.899: ICD-10-CM

## 2018-06-04 DIAGNOSIS — S90.01XA: Primary | ICD-10-CM
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
## 2018-06-04 DIAGNOSIS — F17.200: ICD-10-CM

## 2018-06-04 PROCEDURE — 99284 EMERGENCY DEPT VISIT MOD MDM: CPT
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
## 2019-06-08 ENCOUNTER — HOSPITAL ENCOUNTER (EMERGENCY)
Dept: HOSPITAL 47 - EC | Age: 24
Discharge: HOME | End: 2019-06-08
Payer: COMMERCIAL

## 2019-06-08 VITALS
HEART RATE: 61 BPM | SYSTOLIC BLOOD PRESSURE: 100 MMHG | RESPIRATION RATE: 16 BRPM | DIASTOLIC BLOOD PRESSURE: 62 MMHG | TEMPERATURE: 97.9 F

## 2019-06-08 DIAGNOSIS — S70.02XA: Primary | ICD-10-CM
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
## 2019-06-08 DIAGNOSIS — F17.200: ICD-10-CM

## 2019-06-08 DIAGNOSIS — M25.522: ICD-10-CM
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
## 2019-06-08 PROCEDURE — 73502 X-RAY EXAM HIP UNI 2-3 VIEWS: CPT

## 2019-06-08 PROCEDURE — 99284 EMERGENCY DEPT VISIT MOD MDM: CPT

## 2019-06-08 NOTE — XR
EXAMINATION TYPE: XR Hip LT and AP Pelvis , 3 VIEWS

 

DATE OF EXAM ORDERED: 6/8/2019

 

HISTORY: Pain.

 

COMPARISON: None..

 

FINDINGS:  Bony structures about the pelvis are normal. No fracture is seen. Both hip joints are well
-maintained. The left hip is unremarkable.

 

IMPRESSION: 

 

NO ACUTE OSSEOUS LESION.
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
## 2020-09-22 ENCOUNTER — HOSPITAL ENCOUNTER (OUTPATIENT)
Dept: HOSPITAL 47 - LABWHC1 | Age: 25
Discharge: HOME | End: 2020-09-22
Payer: COMMERCIAL

## 2020-09-22 DIAGNOSIS — Z20.828: Primary | ICD-10-CM

## 2020-11-12 ENCOUNTER — HOSPITAL ENCOUNTER (EMERGENCY)
Dept: HOSPITAL 47 - EC | Age: 25
Discharge: HOME | End: 2020-11-12
Payer: COMMERCIAL

## 2020-11-12 VITALS
DIASTOLIC BLOOD PRESSURE: 88 MMHG | RESPIRATION RATE: 14 BRPM | SYSTOLIC BLOOD PRESSURE: 123 MMHG | HEART RATE: 78 BPM | TEMPERATURE: 98.1 F
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
## 2020-11-12 DIAGNOSIS — S51.811A: Primary | ICD-10-CM

## 2020-11-12 DIAGNOSIS — F17.200: ICD-10-CM
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
## 2020-11-12 PROCEDURE — 12002 RPR S/N/AX/GEN/TRNK2.6-7.5CM: CPT

## 2020-11-12 PROCEDURE — 99282 EMERGENCY DEPT VISIT SF MDM: CPT

## 2020-11-12 NOTE — ED
Wound/Laceration HPI





- General


Chief Complaint: Wound/Laceration


Stated Complaint: Rt arm lac - [facility]


Time Seen by Provider: 11/12/20 06:43


Source: patient


Mode of arrival: ambulatory


Limitations: no limitations





- History of Present Illness


Initial Comments: 


25-year-old male presenting today for chief complaint of right forearm 

laceration.  Patient states she is cup I machine at work he denies injection.  

Patient states his tetanus up-to-date denies any limitations range of motion he 

denies any known foreign body.  Patient states he does not feel there is risk of

foreign body.  He denies additional complaints upon arrival he appears well 

nontoxic bleeding controlled.








- Related Data


                                Home Medications











 Medication  Instructions  Recorded  Confirmed


 


Ibuprofen [Motrin Ib] 600 mg PO TID PRN 06/04/18 06/08/19








                                  Previous Rx's











 Medication  Instructions  Recorded


 


Naproxen [EC-Naproxen] 500 mg PO BID #20 tablet. 06/08/19











                                    Allergies











Allergy/AdvReac Type Severity Reaction Status Date / Time


 


No Known Allergies Allergy   Verified 11/12/20 06:42














Review of Systems


ROS Statement: 


Those systems with pertinent positive or pertinent negative responses have been 

documented in the HPI.





ROS Other: All systems not noted in ROS Statement are negative.





Past Medical History


Past Medical History: Asthma, Thyroid Disorder


History of Any Multi-Drug Resistant Organisms: None Reported


Past Surgical History: No Surgical Hx Reported


Past Anesthesia/Blood Transfusion Reactions: No Reported Reaction


Past Psychological History: No Psychological Hx Reported


Smoking Status: Current every day smoker


Past Alcohol Use History: Occasional


Past Drug Use History: Marijuana





- Past Family History


  ** Father


Additional Family Medical History / Comment(s): hip surgery





General Exam





- General Exam Comments


Initial Comments: 


General:  The patient is awake and alert, in no distress, and does not appear 

acutely ill. 


Eye:  Pupils are equal, round and reactive to light, extra-ocular movements are 

intact.  No nystagmus.  There is normal conjunctiva bilaterally.  No signs of 

icterus.  


Musculoskeletal:  Normal ROM, no tenderness.  Strength 5/5of digits of hand. 

Sensation intact. Radial pulses equal bilaterally 2+.  


Neurological:  A&O x 3. CN II-XII intact grossly, There are no obvious motor or 

sensory deficits. Coordination appears grossly intact. Speech is normal.


Skin:  Skin is warm and dry and no rashes. 3cm laceration of the right forearm 

radial/dorsal aspect. No tendon exposure only exposure of adipose. no bleeding.


Psychiatric:  Cooperative, appropriate mood & affect, normal judgment.  





Limitations: no limitations





Course


                                   Vital Signs











  11/12/20 11/12/20





  06:39 07:37


 


Temperature 98.6 F 98.1 F


 


Pulse Rate 82 78


 


Respiratory 18 14





Rate  


 


Blood Pressure 117/76 123/88


 


O2 Sat by Pulse 97 100





Oximetry  














Procedures





- Laceration


  ** Laceration #1


Consent Obtained: verbal consent


Indication: laceration


Site: upper extremity


Size (cm): 3


Description: linear


Depth: simple, single layer


Anesthetic Used: lidocaine 1%


Anesthesia Technique: local infiltration


Amount (mls): 1


Pre-repair: wound explored, irrigated extensively, deep structures intact


Type of Sutures: nylon


Size of Sutures: 4-0


Number of Sutures: 4


Technique: simple, interrupted


Patient Tolerated Procedure: well, no complications





Medical Decision Making





- Medical Decision Making


no deep injury or tendon injury noted. no FB noted. irrigated wound. closed. 

patient tolerated procedure well. no complications. educated on return 

parameters.Patietn case discussed with Dr. Darden pt discharged appearing werll.










Disposition


Clinical Impression: 


 Arm laceration





Disposition: HOME SELF-CARE


Condition: Good


Instructions (If sedation given, give patient instructions):  Care For Your 

Stitches (ED), Laceration (ED)


Additional Instructions: 


Please use medication as discussed.  Please follow-up for suture removal in 7-10

days, watch for redness, drainage, swelling and increasing pain--thesea re signs

of infection as well as fever.  Please return to emergency room if the symptoms 

increase or worsen or for any other concerns.


Is patient prescribed a controlled substance at d/c from ED?: No


Referrals: 


Ross Oates Jr,  [Primary Care Provider] - 1-2 days


Time of Disposition: 07:24

## 2021-04-14 ENCOUNTER — HOSPITAL ENCOUNTER (EMERGENCY)
Dept: HOSPITAL 47 - EC | Age: 26
Discharge: HOME | End: 2021-04-14
Payer: COMMERCIAL

## 2021-04-14 VITALS
RESPIRATION RATE: 18 BRPM | HEART RATE: 80 BPM | TEMPERATURE: 97.9 F | SYSTOLIC BLOOD PRESSURE: 118 MMHG | DIASTOLIC BLOOD PRESSURE: 71 MMHG

## 2021-04-14 DIAGNOSIS — K20.90: Primary | ICD-10-CM

## 2021-04-14 DIAGNOSIS — J45.909: ICD-10-CM

## 2021-04-14 DIAGNOSIS — F12.90: ICD-10-CM

## 2021-04-14 DIAGNOSIS — F17.200: ICD-10-CM

## 2021-04-14 PROCEDURE — 71046 X-RAY EXAM CHEST 2 VIEWS: CPT

## 2021-04-14 PROCEDURE — 99284 EMERGENCY DEPT VISIT MOD MDM: CPT

## 2021-04-14 NOTE — XR
EXAMINATION TYPE: XR chest 2V

 

DATE OF EXAM: 4/14/2021

 

COMPARISON: 6/28/2017

 

HISTORY: Possible foreign body

 

TECHNIQUE: 2 views

 

FINDINGS: Heart and mediastinum are normal. Lungs are clear. Diaphragm is normal. Bony thorax is inta
ct. There is no evidence of radiopaque foreign body. There is no evidence of atelectasis.

 

IMPRESSION: Normal chest. No change.

## 2021-04-14 NOTE — ED
Recheck HPI





- General


Chief Complaint: Recheck/Abnormal Lab/Rx


Stated Complaint: recheck


Time Seen by Provider: 04/14/21 19:50


Source: patient


Mode of arrival: ambulatory


Limitations: no limitations





- History of Present Illness


Initial Comments: 





Patient is a 25-year-old male presenting to the emergency department with 

concerns of a possible pill stuck in his throat.  Patient states about 4 hours 

prior to arrival he took 2 ibuprofen, salt him at the same time and feels like 

they went down the wrong way.  He states he coughed a bunch and one of the pills

did, but he feels like the other 1 is lodged in his throat.  He has had a few 

episodes of vomiting, he is able to keep liquids down.  He states he is having 

some mild sore throat but no difficulty in breathing.  He is talking without 

difficulty.  He has no pertinent past medical history and takes no medications. 

No fevers or chills.  He has no further complaints.





- Related Data


                                Home Medications











 Medication  Instructions  Recorded  Confirmed


 


Ibuprofen [Motrin Ib] 600 mg PO TID PRN 06/04/18 06/08/19








                                  Previous Rx's











 Medication  Instructions  Recorded


 


Naproxen [EC-Naproxen] 500 mg PO BID #20 tablet. 06/08/19











                                    Allergies











Allergy/AdvReac Type Severity Reaction Status Date / Time


 


No Known Allergies Allergy   Verified 04/14/21 19:08














Review of Systems


ROS Statement: 


Those systems with pertinent positive or pertinent negative responses have been 

documented in the HPI.





ROS Other: All systems not noted in ROS Statement are negative.





Past Medical History


Past Medical History: Asthma, Thyroid Disorder


History of Any Multi-Drug Resistant Organisms: None Reported


Past Surgical History: No Surgical Hx Reported


Past Anesthesia/Blood Transfusion Reactions: No Reported Reaction


Past Psychological History: No Psychological Hx Reported


Smoking Status: Current every day smoker


Past Alcohol Use History: Occasional


Past Drug Use History: Marijuana





- Past Family History


  ** Father


Additional Family Medical History / Comment(s): hip surgery





General Exam





- General Exam Comments


Initial Comments: 





GENERAL: 


Patient is well-developed and well-nourished.  Patient is nontoxic and in no 

acute distress.





HEAD: 


Atraumatic, normocephalic.





EYES:


Pupils equal round and reactive to light, extraocular movements intact, sclera 

anicteric, conjunctiva are normal.  Eyelids were unremarkable.





ENT: 


Nares patent, oropharynx clear without exudates.  Moist mucous membranes.





NECK: 


Normal range of motion, supple without lymphadenopathy or JVD.





LUNGS:


Unlabored respirations.  Breath sounds clear to auscultation bilaterally and 

equal.  No wheezes rales or rhonchi.





HEART:


Regular rate and rhythm without murmurs, rubs or gallops.





ABDOMEN: 


Soft, nontender, normoactive bowel sounds.  No guarding, no rebound.  No masses 

appreciated.





: Deferred 





MUSCULOSKELETAL: 


Normal extremities with adequate strength and normal range of motion, no pitting

or edema.  No clubbing or cyanosis.





NEUROLOGICAL: 


Patient is alert and oriented x 3.  Motor and sensory are also intact.  Cranial 

nerves II through XII grossly intact.  Symmetrical smile.  Normal speech, normal

gait.   





PSYCH:


Normal mood, normal affect.





SKIN:


 Warm, Dry, normal turgor, no rashes or lesions noted.


Limitations: no limitations





Course


                                   Vital Signs











  04/14/21 04/14/21





  19:04 20:40


 


Temperature 98.0 F 97.9 F


 


Pulse Rate 76 80


 


Respiratory 20 18





Rate  


 


Blood Pressure 120/81 118/71


 


O2 Sat by Pulse 99 98





Oximetry  














Medical Decision Making





- Medical Decision Making





Patient is a 25-year-old male here with concerns of a pill stuck in his throat. 

His exam is unremarkable, is in no acute distress, tolerating liquids here in 

the ER.  His chest x-ray shows no acute abnormality.  Discussed with patient 

that this tablet should dissolve on its own, continue to increase his fluid 

intake including carbonated beverages.  He can follow up with his doctor.  

Patient is stable for discharge.  Patient is in agreement with this plan of 

care.  Return parameters were discussed with the patient and they verbalized 

understanding.  Case discussed with Dr. Johnson.





Disposition


Clinical Impression: 


 Esophagitis





Disposition: HOME SELF-CARE


Condition: Stable


Instructions (If sedation given, give patient instructions):  Esophageal Foreign

Body (ED)


Additional Instructions: 


Please return to the Emergency Department if symptoms worsen or any other 

concerns.


The pills should dissolve, continue to drink lots of fluids, carbonated 

beverages as discussed.


Follow up with your primary care physician.





Is patient prescribed a controlled substance at d/c from ED?: No


Referrals: 


Ross Oates Jr, DO [Primary Care Provider] - 1-2 days

## 2021-04-15 ENCOUNTER — HOSPITAL ENCOUNTER (EMERGENCY)
Dept: HOSPITAL 47 - EC | Age: 26
Discharge: HOME | End: 2021-04-15
Payer: COMMERCIAL

## 2021-04-15 VITALS
SYSTOLIC BLOOD PRESSURE: 128 MMHG | TEMPERATURE: 98.4 F | HEART RATE: 81 BPM | RESPIRATION RATE: 18 BRPM | DIASTOLIC BLOOD PRESSURE: 87 MMHG

## 2021-04-15 DIAGNOSIS — F17.200: ICD-10-CM

## 2021-04-15 DIAGNOSIS — T18.108A: Primary | ICD-10-CM

## 2021-04-15 DIAGNOSIS — F12.90: ICD-10-CM

## 2021-04-15 DIAGNOSIS — J45.909: ICD-10-CM

## 2021-04-15 PROCEDURE — 99282 EMERGENCY DEPT VISIT SF MDM: CPT

## 2021-04-15 NOTE — ED
General Adult HPI





- General


Chief complaint: ENT


Stated complaint: Motrin stuck in throat


Source: patient


Mode of arrival: ambulatory


Limitations: no limitations





- History of Present Illness


Initial comments: 


Dictation was produced using dragon dictation software. please excuse any 

grammatical, word or spelling errors. 





This patient was cared for during a federal and state declared state of 

emergency secondary to Covid 19





Chief Complaint: 25-year-old male presents with foreign body sensation in the 

throat





History of Present Illness: 25-year-old male he states that a day and a half ago

he swallowed 2 Motrin pills to help with some dental pain.  He states that he 

felt like the pills got stuck in his throat.  He was able to spit up one of the 

pills however felt like one pill is stuck in his throat on the left side for the

last day and a half.  Upon arriving to the emergency department states his 

symptoms dramatically improved.  States he does not have any trouble drinking 

however he does note that he has been having some difficulties with eating.  He 

denies any sensation of any foreign bodies currently.








The ROS documented in this emergency department record has been reviewed and 

confirmed by me.  Those systems with pertinent positive or negative responses 

have been documented in the HPI.  All other systems are other negative and/or 

noncontributory.








PHYSICAL EXAM:


General Impression: Alert and oriented x3, not in acute distress


HEENT: Normocephalic atraumatic, extra-ocular movements intact, pupils equal and

reactive to light bilaterally, mucous membranes moist, no drooling, tolerating 

oral intake, no respiratory distress.  There appears to be a little 2 mm ulcer 

in the baclofen his oropharynx


Cardiovascular: Heart regular rate and rhythm


Chest: Able to complete full sentences, no retractions, no tachypnea


Abdomen: abdomen soft, non-tender, non-distended, no organomegaly


Musculoskeletal: Pulses present and equal in all extremities, no peripheral 

edema


Motor:  no focal deficits noted


Neurological: CN II-XII grossly intact, no focal motor or sensory deficits noted


Skin: Intact with no visualized rashes


Psych: Normal affect and mood





ED course: 25-year-old male presents to the emergency department for possible 

esophageal pill signs upon arrival are within acceptable limits.  In the 

emergency department states his symptoms dramatically improved.  He states he 

does not feel like the pill is stuck in his throat anymore.  Patient's 

tolerating oral intake.  Patient was discharged.  He is given follow-up with GI 

doctor for outpatient dysphasia workup.  Patient is advised to stay well-

hydrated however to drink his calories and advance his diet as tolerated.  

Return precautions discussed.  Patient is agreeable to discharge.

















- Related Data


                                Home Medications











 Medication  Instructions  Recorded  Confirmed


 


Ibuprofen [Motrin Ib] 600 mg PO TID PRN 06/04/18 06/08/19








                                  Previous Rx's











 Medication  Instructions  Recorded


 


Naproxen [EC-Naproxen] 500 mg PO BID #20 tablet. 06/08/19











                                    Allergies











Allergy/AdvReac Type Severity Reaction Status Date / Time


 


No Known Allergies Allergy   Verified 04/15/21 21:54














Review of Systems


ROS Statement: 


Those systems with pertinent positive or pertinent negative responses have been 

documented in the HPI.





ROS Other: All systems not noted in ROS Statement are negative.





Past Medical History


Past Medical History: Asthma, Thyroid Disorder


History of Any Multi-Drug Resistant Organisms: None Reported


Past Surgical History: No Surgical Hx Reported


Past Anesthesia/Blood Transfusion Reactions: No Reported Reaction


Past Psychological History: No Psychological Hx Reported


Smoking Status: Current every day smoker


Past Alcohol Use History: Occasional


Past Drug Use History: Marijuana





- Past Family History


  ** Father


Additional Family Medical History / Comment(s): hip surgery





General Exam


Limitations: no limitations





Course


                                   Vital Signs











  04/15/21





  21:52


 


Temperature 98.4 F


 


Pulse Rate 81


 


Respiratory 18





Rate 


 


Blood Pressure 128/87


 


O2 Sat by Pulse 97





Oximetry 














Disposition


Clinical Impression: 


 Esophageal foreign body





Disposition: HOME SELF-CARE


Condition: Fair


Instructions (If sedation given, give patient instructions):  Esophageal Foreign

Body (ED)


Is patient prescribed a controlled substance at d/c from ED?: No


Referrals: 


Fifi Ibarra MD [STAFF PHYSICIAN] - 1-2 days


Time of Disposition: 23:05

## 2021-04-23 ENCOUNTER — HOSPITAL ENCOUNTER (EMERGENCY)
Dept: HOSPITAL 47 - EC | Age: 26
LOS: 1 days | Discharge: HOME | End: 2021-04-24
Payer: COMMERCIAL

## 2021-04-23 VITALS
DIASTOLIC BLOOD PRESSURE: 67 MMHG | SYSTOLIC BLOOD PRESSURE: 125 MMHG | HEART RATE: 103 BPM | RESPIRATION RATE: 20 BRPM | TEMPERATURE: 98.2 F

## 2021-04-23 DIAGNOSIS — F17.200: ICD-10-CM
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
## 2021-04-23 DIAGNOSIS — S91.332A: Primary | ICD-10-CM

## 2021-04-23 PROCEDURE — 99284 EMERGENCY DEPT VISIT MOD MDM: CPT

## 2021-04-24 NOTE — XR
EXAM:

  XR Left Foot Complete, 3 or More Views

 

CLINICAL HISTORY:

  Left foot pain.

 

TECHNIQUE:

  Frontal, lateral and oblique views of the left foot.

 

COMPARISON:

  No previous study.

 

FINDINGS:

  Bones/joints:  Mild hallux valgus deformity.  No acute fracture, 

dislocation, or destructive process be  The calcaneus is unremarkable.

  Soft tissues:  Soft tissues are within normal limits.  No radiopaque 

foreign body.

 

IMPRESSION:     

1.  Mild hallux valgus deformity.

2.  No acute fracture, dislocation, or destructive process.

## 2021-04-24 NOTE — ED
Extremity Problem HPI





- General


Chief complaint: Skin/Abscess/Foreign Body


Stated complaint: foot injury


Time Seen by Provider: 04/24/21 00:17


Source: family, RN notes reviewed, old records reviewed


Mode of arrival: ambulatory


Limitations: no limitations





- History of Present Illness


Initial comments: 





This is a 25-year-old male to the ER for evaluation.  Patient stepped on a nail 

his left foot prior travel.  Mailed to go shoe issue.  Patient has significant 

swelling to the left but he was able to pull the nail out without significant 

difficulty.  Patient has no other complaints or injuries.  Tetanus is up-to-date


MD Complaint: extremity pain, other ((Toe, foot pain secondary to nail foreign 

body)


-: hour(s)


Location: left, toe, other (Foot)


History of Same: No


Radiation: proximal


Severity scale (1-10): 5


Quality: aching


Consistency: constant


Improves with: nothing


Worsens with: nothing


Associated Symptoms: denies other symptoms





- Related Data


                                Home Medications











 Medication  Instructions  Recorded  Confirmed


 


Ibuprofen [Motrin Ib] 600 mg PO TID PRN 06/04/18 06/08/19








                                  Previous Rx's











 Medication  Instructions  Recorded


 


Naproxen [EC-Naproxen] 500 mg PO BID #20 tablet. 06/08/19











                                    Allergies











Allergy/AdvReac Type Severity Reaction Status Date / Time


 


No Known Allergies Allergy   Verified 04/23/21 23:49














Review of Systems


ROS Statement: 


Those systems with pertinent positive or pertinent negative responses have been 

documented in the HPI.





ROS Other: All systems not noted in ROS Statement are negative.





Past Medical History


Past Medical History: Asthma, Thyroid Disorder


History of Any Multi-Drug Resistant Organisms: None Reported


Past Surgical History: No Surgical Hx Reported


Past Anesthesia/Blood Transfusion Reactions: No Reported Reaction


Past Psychological History: ADD/ADHD


Smoking Status: Current every day smoker


Past Alcohol Use History: Occasional


Past Drug Use History: Marijuana





- Past Family History


  ** Father


Additional Family Medical History / Comment(s): hip surgery





General Exam


Limitations: no limitations


General appearance: alert, in no apparent distress, anxious


Head exam: Present: atraumatic, normocephalic, normal inspection


Eye exam: Present: normal appearance, PERRL, EOMI.  Absent: scleral icterus, 

conjunctival injection, periorbital swelling


ENT exam: Present: normal exam, mucous membranes moist


Neck exam: Present: normal inspection.  Absent: tenderness, meningismus, 

lymphadenopathy


Respiratory exam: Present: normal lung sounds bilaterally.  Absent: respiratory 

distress, wheezes, rales, rhonchi, stridor


Cardiovascular Exam: Present: normal rhythm, tachycardia, normal heart sounds.  

Absent: systolic murmur, diastolic murmur, rubs, gallop, clicks


GI/Abdominal exam: Present: soft, normal bowel sounds.  Absent: distended, 

tenderness, guarding, rebound, rigid


Extremities exam: Present: normal inspection, full ROM, normal capillary refill,

other (Puncture wound left great toe area with swelling).  Absent: tenderness, 

pedal edema, joint swelling, calf tenderness


Back exam: Present: normal inspection


Neurological exam: Present: alert, oriented X3, CN II-XII intact


Psychiatric exam: Present: normal affect, normal mood


Skin exam: Present: warm, dry, intact, normal color.  Absent: rash





Course


                                   Vital Signs











  04/23/21





  23:44


 


Temperature 98.2 F


 


Pulse Rate 103 H


 


Respiratory 20





Rate 


 


Blood Pressure 125/67


 


O2 Sat by Pulse 100





Oximetry 














- Reevaluation(s)


Reevaluation #1: 





04/24/21 01:17


Medical record is reviewed


Reevaluation #2: 





04/24/21 01:17


Patient informed of findings here in the emergency department





Medical Decision Making





- Medical Decision Making





White female the puncture wound left foot.  Patient placed on antibiotics 

tetanus is up-to-date.  X-ray otherwise negative and patient can be discharged 

home





- Radiology Data


Radiology results: report reviewed (X-ray left foot is negative for acute 

disease), image reviewed





Disposition


Clinical Impression: 


 Puncture wound of skin from metal nail, Puncture wound of foot





Disposition: HOME SELF-CARE


Condition: Good


Instructions (If sedation given, give patient instructions):  Puncture Wound 

(ED)


Is patient prescribed a controlled substance at d/c from ED?: No


Referrals: 


Ross Oates Jr,  [Primary Care Provider] - 1-2 days

## 2022-01-17 ENCOUNTER — HOSPITAL ENCOUNTER (EMERGENCY)
Dept: HOSPITAL 47 - EC | Age: 27
Discharge: HOME | End: 2022-01-17
Payer: COMMERCIAL

## 2022-01-17 VITALS
SYSTOLIC BLOOD PRESSURE: 107 MMHG | HEART RATE: 67 BPM | RESPIRATION RATE: 18 BRPM | DIASTOLIC BLOOD PRESSURE: 71 MMHG | TEMPERATURE: 97 F

## 2022-01-17 DIAGNOSIS — J45.909: ICD-10-CM

## 2022-01-17 DIAGNOSIS — U07.1: Primary | ICD-10-CM

## 2022-01-17 DIAGNOSIS — F17.200: ICD-10-CM

## 2022-01-17 PROCEDURE — 99283 EMERGENCY DEPT VISIT LOW MDM: CPT

## 2022-01-17 PROCEDURE — 87635 SARS-COV-2 COVID-19 AMP PRB: CPT

## 2022-01-17 NOTE — ED
General Adult HPI





- General


Chief complaint: Upper Respiratory Infection


Stated complaint: congestion


Time Seen by Provider: 01/17/22 12:20


Source: patient


Mode of arrival: ambulatory


Limitations: no limitations





- History of Present Illness


Initial comments: 





This 26-year-old male presents to the emergency department with congestion and 

cough 2 days.  Patient states he was at work on Saturday and left due to not 

feeling well.  Patient states he did vomit one time on Saturday and continued to

drink lots of fluids and water and then began to feel better.  Patient states he

woke up Sunday morning with a cough, dull headache, and nasal congestion.  

Patient states his headache has resolved, however, he still does have a cough 

and he is bringing up some clear mucus.  He states he has not been vaccinated 

for COVID-19.  Patient denies any chest pain, shortness of breath, abdominal 

pain, diarrhea, hemoptysis, blood in sputum, dizziness, headache, fever, change 

in vision.





- Related Data


                                Home Medications











 Medication  Instructions  Recorded  Confirmed


 


Ibuprofen [Motrin Ib] 600 mg PO TID PRN 06/04/18 06/08/19








                                  Previous Rx's











 Medication  Instructions  Recorded


 


Naproxen [EC-Naproxen] 500 mg PO BID #20 tablet. 06/08/19


 


Amoxic-Pot Clav 875-125Mg 1 tab PO Q12HR #20 tablet 04/24/21





[Augmentin 875-125]  


 


Ciprofloxacin HCl [Cipro] 500 mg PO Q12HR #20 tablet 04/24/21


 


Albuterol Sulfate [Albuterol 1 - 2 puff PO Q4-6H #8.5 gm 01/17/22





Sulfate Hfa]  











                                    Allergies











Allergy/AdvReac Type Severity Reaction Status Date / Time


 


No Known Allergies Allergy   Verified 01/17/22 11:00














Review of Systems


ROS Statement: 


Those systems with pertinent positive or pertinent negative responses have been 

documented in the HPI.





ROS Other: All systems not noted in ROS Statement are negative.





Past Medical History


Past Medical History: Asthma, Thyroid Disorder


History of Any Multi-Drug Resistant Organisms: None Reported


Past Surgical History: No Surgical Hx Reported


Past Anesthesia/Blood Transfusion Reactions: No Reported Reaction


Past Psychological History: ADD/ADHD


Smoking Status: Current every day smoker


Past Alcohol Use History: Occasional


Past Drug Use History: Marijuana





- Past Family History


  ** Father


Additional Family Medical History / Comment(s): hip surgery





General Exam


Limitations: no limitations


General appearance: alert, in no apparent distress


Head exam: Present: atraumatic, normocephalic


Eye exam: Present: normal appearance, EOMI


ENT exam: Present: normal exam, mucous membranes moist


Neck exam: Present: full ROM


Respiratory exam: Present: normal lung sounds bilaterally.  Absent: respiratory 

distress, wheezes, rales, rhonchi, stridor


Cardiovascular Exam: Present: regular rate, normal rhythm, normal heart sounds. 

Absent: systolic murmur, diastolic murmur, rubs, gallop, clicks


GI/Abdominal exam: Present: soft, normal bowel sounds.  Absent: distended, 

tenderness, guarding, rebound, rigid


Extremities exam: Present: full ROM


Back exam: Present: full ROM.  Absent: tenderness, CVA tenderness (R), CVA 

tenderness (L)


Neurological exam: Present: alert, oriented X3, CN II-XII intact


Psychiatric exam: Present: normal affect, normal mood


Skin exam: Present: warm, dry, intact, normal color.  Absent: rash





Course


                                   Vital Signs











  01/17/22





  10:56


 


Temperature 97.0 F L


 


Pulse Rate 67


 


Respiratory 18





Rate 


 


Blood Pressure 107/71


 


O2 Sat by Pulse 99





Oximetry 














Medical Decision Making





- Medical Decision Making





This 26-year-old male presents to the emergency department with nasal congestion

and cough 2 days.  Patient tested positive for COVID-19.  Patient denies any 

chest pain, shortness of breath, abdominal pain, headache.  Patient sent home 

with strict return precautions.  Conservative treatment discussed.  Albuterol 

inhaler prescribed. Patient verbally agreed to plan.  Patient to follow-up with 

primary care provider next 1-2 days.  Patient sent home in stable condition.  My

attending is Dr. Laughlin.





- Lab Data


                                   Lab Results











  01/17/22 Range/Units





  11:03 


 


Coronavirus (PCR)  Detected A  (Not Detectd)  














Disposition


Clinical Impression: 


 COVID-19





Disposition: HOME SELF-CARE


Condition: Stable


Instructions (If sedation given, give patient instructions):  Coronavirus 

Disease 2019 (COVID-19)


Additional Instructions: 


Please return to the emergency department with any concerning, new, or worsening

symptoms.  Please follow-up with primary care provider next 1-2 days.  Please 

take over-the-counter zinc, vitamin C, vitamin D.  Take Tylenol or Motrin as 

directed.  Advised to get a pulse oximeter from [facility] and to return to the 

emergency department oxygen is ever below 90%.


Prescriptions: 


Albuterol Sulfate [Albuterol Sulfate Hfa] 1 - 2 puff PO Q4-6H #8.5 gm


Is patient prescribed a controlled substance at d/c from ED?: No


Referrals: 


Ross Oates Jr,  [Primary Care Provider] - 1-2 days


Time of Disposition: 12:56

## 2022-06-18 ENCOUNTER — HOSPITAL ENCOUNTER (EMERGENCY)
Dept: HOSPITAL 47 - EC | Age: 27
Discharge: HOME | End: 2022-06-18
Payer: COMMERCIAL

## 2022-06-18 VITALS
RESPIRATION RATE: 18 BRPM | HEART RATE: 79 BPM | DIASTOLIC BLOOD PRESSURE: 71 MMHG | TEMPERATURE: 98.1 F | SYSTOLIC BLOOD PRESSURE: 121 MMHG

## 2022-06-18 DIAGNOSIS — J00: Primary | ICD-10-CM

## 2022-06-18 DIAGNOSIS — F17.200: ICD-10-CM

## 2022-06-18 DIAGNOSIS — F12.90: ICD-10-CM

## 2022-06-18 DIAGNOSIS — J45.909: ICD-10-CM

## 2022-06-18 DIAGNOSIS — Z79.51: ICD-10-CM

## 2022-06-18 DIAGNOSIS — Z20.822: ICD-10-CM

## 2022-06-18 PROCEDURE — 99284 EMERGENCY DEPT VISIT MOD MDM: CPT

## 2022-06-18 PROCEDURE — 87635 SARS-COV-2 COVID-19 AMP PRB: CPT

## 2022-06-18 PROCEDURE — 87502 INFLUENZA DNA AMP PROBE: CPT

## 2022-06-18 NOTE — ED
URI HPI





- General


Chief Complaint: Upper Respiratory Infection


Stated Complaint: head cold


Source: patient


Mode of arrival: ambulatory


Limitations: no limitations





- History of Present Illness


Initial Comments: 


Patient is a 26-year-old male presented with a chief complaint of congestion.  

Patient states he has seasonal allergies which normally involves congestion 

however this feels worse.  Patient also endorses a mild headache.  Denies fever,

chills, shortness of breath, chest pain, and other concerns.  States his sister 

has been sick at home with vomiting.





- Related Data


                                Home Medications











 Medication  Instructions  Recorded  Confirmed


 


Ibuprofen [Motrin Ib] 600 mg PO TID PRN 06/04/18 06/08/19








                                  Previous Rx's











 Medication  Instructions  Recorded


 


Naproxen [EC-Naproxen] 500 mg PO BID #20 tablet. 06/08/19


 


Amoxic-Pot Clav 875-125Mg 1 tab PO Q12HR #20 tablet 04/24/21





[Augmentin 875-125]  


 


Ciprofloxacin HCl [Cipro] 500 mg PO Q12HR #20 tablet 04/24/21


 


Albuterol Sulfate [Albuterol 1 - 2 puff PO Q4-6H #8.5 gm 01/17/22





Sulfate Hfa]  











                                    Allergies











Allergy/AdvReac Type Severity Reaction Status Date / Time


 


No Known Allergies Allergy   Verified 06/18/22 19:27














Review of Systems


ROS Statement: 


Those systems with pertinent positive or pertinent negative responses have been 

documented in the HPI.





ROS Other: All systems not noted in ROS Statement are negative.





Past Medical History


Past Medical History: Asthma, Thyroid Disorder


History of Any Multi-Drug Resistant Organisms: None Reported


Past Surgical History: No Surgical Hx Reported


Past Anesthesia/Blood Transfusion Reactions: No Reported Reaction


Past Psychological History: ADD/ADHD


Smoking Status: Current every day smoker


Past Alcohol Use History: Occasional


Past Drug Use History: Marijuana





- Past Family History


  ** Father


Additional Family Medical History / Comment(s): hip surgery





General Exam


Limitations: no limitations


General appearance: alert, in no apparent distress


Head exam: Present: atraumatic, normocephalic, normal inspection


Eye exam: Present: normal appearance, PERRL, EOMI.  Absent: scleral icterus, 

conjunctival injection, periorbital swelling


ENT exam: Present: normal oropharynx


Neck exam: Present: normal inspection, full ROM


Respiratory exam: Present: normal lung sounds bilaterally.  Absent: respiratory 

distress, wheezes, rales, rhonchi, stridor


Cardiovascular Exam: Present: regular rate, normal rhythm, normal heart sounds. 

Absent: systolic murmur, diastolic murmur, rubs, gallop, clicks


Neurological exam: Present: alert, oriented X3, CN II-XII intact


Psychiatric exam: Present: normal affect, normal mood


Skin exam: Present: warm, dry, intact, normal color.  Absent: rash





Course


                                   Vital Signs











  06/18/22





  19:26


 


Temperature 98.1 F


 


Pulse Rate 79


 


Respiratory 18





Rate 


 


Blood Pressure 121/71


 


O2 Sat by Pulse 97





Oximetry 














Medical Decision Making





- Medical Decision Making


This is a 26-year-old male who presents with congestion and headache.  Thorough 

history and examination were performed.  Patient looks well. Denies chest pain 

and shortness of breath. 








COVID-19 and influenza A/B are not detected.  Results discussed with patient.  

Patient may be experiencing symptoms due to allergy or viral upper respiratory 

infection.  He was given Motrin 800 for headache and Flonase for congestion.  He

will be discharged with symptomatic education.  Patient states claritin works 

well for him and he will pick some up at the pharmacy.  Instructed to follow up 

with his primary care provider in one to 2 days.  Patient verbalizes 

understanding and is agreeable to this plan.








Dr. Chaudhari is my attending. 











- Lab Data


                                   Lab Results











  06/18/22 06/18/22 Range/Units





  19:31 19:31 


 


Coronavirus (PCR)   Not Detected  (Not Detectd)  


 


Influenza Type A RNA  Not Detected   (Not Detectd)  


 


Influenza Type B (PCR)  Not Detected   (Not Detectd)  














Disposition


Clinical Impression: 


 Common cold





Disposition: HOME SELF-CARE


Condition: Good


Instructions (If sedation given, give patient instructions):  Upper Respiratory 

Infection (ED)


Additional Instructions: 


Please use Flonase as directed.  Take Claritin or Zyrtec for runny nose and 

other allergy symptoms.  Take anti-inflammatories such as Motrin for headache 

and increase fluids as much as possible.  Follow-up with primary care provider 

in one to 2 days.  Return to the emergency department if you experience new, 

concerning, or worsening symptoms.


Is patient prescribed a controlled substance at d/c from ED?: No


Referrals: 


Ross Oates Jr,  [Primary Care Provider] - 1-2 days


Time of Disposition: 22:42

## 2022-09-03 ENCOUNTER — HOSPITAL ENCOUNTER (EMERGENCY)
Dept: HOSPITAL 47 - EC | Age: 27
Discharge: HOME | End: 2022-09-03
Payer: COMMERCIAL

## 2022-09-03 VITALS
TEMPERATURE: 98.2 F | RESPIRATION RATE: 15 BRPM | DIASTOLIC BLOOD PRESSURE: 74 MMHG | HEART RATE: 71 BPM | SYSTOLIC BLOOD PRESSURE: 115 MMHG

## 2022-09-03 DIAGNOSIS — E07.9: ICD-10-CM

## 2022-09-03 DIAGNOSIS — J45.909: ICD-10-CM

## 2022-09-03 DIAGNOSIS — F17.200: ICD-10-CM

## 2022-09-03 DIAGNOSIS — S09.90XA: Primary | ICD-10-CM

## 2022-09-03 DIAGNOSIS — M54.2: ICD-10-CM

## 2022-09-03 DIAGNOSIS — M54.9: ICD-10-CM
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
## 2022-09-03 PROCEDURE — 99284 EMERGENCY DEPT VISIT MOD MDM: CPT

## 2022-09-03 PROCEDURE — 71110 X-RAY EXAM RIBS BIL 3 VIEWS: CPT

## 2022-09-03 PROCEDURE — 72125 CT NECK SPINE W/O DYE: CPT

## 2022-09-03 PROCEDURE — 96372 THER/PROPH/DIAG INJ SC/IM: CPT

## 2022-09-03 PROCEDURE — 70450 CT HEAD/BRAIN W/O DYE: CPT

## 2022-09-03 NOTE — CT
EXAMINATION TYPE: CT brain cspine wo con

 

DATE OF EXAM: 9/3/2022

 

COMPARISON: None

 

HISTORY: pain after fall off long board and hit his head.

 

CT DLP: 1368.4 mGycm

Automated exposure control for dose reduction was used.

 

TECHNIQUE: CT scan of the head and cervical spine are performed without contrast.

 

FINDINGS:   There is no acute intracranial hemorrhage, mass effect, or midline shift identified.  The
 ventricles and sulci are within normal limits in size.  The globes are intact and the visualized sin
uses are clear.

 

Cervical spine is visualized in its entirety from C1 through upper thoracic levels and demonstrates s
atisfactory alignment without evidence of acute fracture or dislocation.  Prevertebral soft tissue ap
pears within normal limits.  The C1-C2 articulation is unremarkable.  

 

IMPRESSION:

1. There is no acute fracture or dislocation evident in the cervical spine.

2. No acute intracranial hemorrhage, mass effect, or midline shift is seen.

## 2022-09-03 NOTE — ED
Head Injury HPI





- General


Chief complaint: Head Injury


Stated complaint: fall from skateboard, head injury


Time Seen by Provider: 09/03/22 12:45


Source: patient


Mode of arrival: ambulatory


Limitations: no limitations





- History of Present Illness


Initial comments: 


Patient is a 27-year-old male presenting with chief complaint of head injury.  

Patient was on his long board today when he hit a crack in the sidewalk and fell

backwards.  Patient landed on his right shoulder and back of the head.  Patient 

is complaining of headache and neck pain as well as right-sided back pain.  

Patient believes that he had a brief loss of consciousness that lasted a few 

seconds.  No nausea or vomiting.  No dizziness.  No vision or hearing changes.  

No chest pain or shortness of breath.  No abdominal pain.  No hematuria.  No 

weakness, numbness, tingling.








- Related Data


                                Home Medications











 Medication  Instructions  Recorded  Confirmed


 


Ibuprofen [Motrin Ib] 600 mg PO TID PRN 06/04/18 06/08/19








                                  Previous Rx's











 Medication  Instructions  Recorded


 


Naproxen [EC-Naproxen] 500 mg PO BID #20 tablet. 06/08/19


 


Amoxic-Pot Clav 875-125Mg 1 tab PO Q12HR #20 tablet 04/24/21





[Augmentin 875-125]  


 


Ciprofloxacin HCl [Cipro] 500 mg PO Q12HR #20 tablet 04/24/21


 


Albuterol Sulfate [Albuterol 1 - 2 puff PO Q4-6H #8.5 gm 01/17/22





Sulfate Hfa]  











Allergies/Adverse reactions: 


                                    Allergies











Allergy/AdvReac Type Severity Reaction Status Date / Time


 


No Known Allergies Allergy   Verified 09/03/22 12:06














Review of Systems


ROS Statement: 


Those systems with pertinent positive or pertinent negative responses have been 

documented in the HPI.





ROS Other: All systems not noted in ROS Statement are negative.





Past Medical History


Past Medical History: Asthma, Thyroid Disorder


History of Any Multi-Drug Resistant Organisms: None Reported


Past Surgical History: No Surgical Hx Reported


Past Anesthesia/Blood Transfusion Reactions: No Reported Reaction


Past Psychological History: ADD/ADHD


Smoking Status: Current every day smoker


Past Alcohol Use History: Occasional


Past Drug Use History: Marijuana





- Past Family History


  ** Father


Additional Family Medical History / Comment(s): hip surgery





General Exam


Limitations: no limitations


General appearance: alert, in no apparent distress


Head exam: Present: atraumatic, normocephalic, normal inspection


Eye exam: Present: normal appearance, PERRL, EOMI.  Absent: scleral icterus, 

periorbital swelling, periorbital tenderness


Pupils: Present: normal accommodation


Neck exam: Present: normal inspection, full ROM.  Absent: tenderness


Respiratory exam: Present: normal lung sounds bilaterally.  Absent: respiratory 

distress, wheezes, rales, rhonchi, stridor


Cardiovascular Exam: Present: regular rate, normal rhythm, normal heart sounds. 

Absent: systolic murmur, diastolic murmur, rubs, gallop, clicks


GI/Abdominal exam: Present: soft.  Absent: distended, tenderness, guarding, 

rebound, rigid


Extremities exam: Present: normal inspection, full ROM


Back exam: Present: normal inspection, full ROM, paraspinal tenderness.  Absent:

vertebral tenderness


Neurological exam: Present: alert, oriented X3, CN II-XII intact


  ** Expanded


Patient oriented to: Present: person, place, time


Speech: Present: fluid speech


Cranial nerves: EOM's Intact: Normal, Tongue Deviation: Normal, Facial 

Sensation: Normal


Cerebellar function: Finger to Nose: Normal


Sensory exam: Upper Extremity Light Touch: Normal, Lower Extremity Light Touch: 

Normal


Motor strength exam: RUE: 5, LUE: 5, RLE: 5, LLE: 5


Eye Response: (4) open spontaneously


Motor Response: (6) obeys commands


Verbal Response: (5) oriented


Alejandra Total: 15


Psychiatric exam: Present: normal affect, normal mood


Skin exam: Present: warm, dry, intact, normal color.  Absent: rash





Course


                                   Vital Signs











  09/03/22 09/03/22





  12:03 16:18


 


Temperature 97.3 F L 98.2 F


 


Pulse Rate 72 71


 


Respiratory 16 15





Rate  


 


Blood Pressure 116/81 115/74


 


O2 Sat by Pulse 99 98





Oximetry  














Medical Decision Making





- Medical Decision Making


Patient is a 27-year-old male presenting with chief complaint of head injury.  

Patient fell off of his long board today after hitting a crack in the sidewalk, 

he fell backwards onto the right shoulder and back of his head.  Patient states 

he had a brief loss of consciousness that lasted for a few seconds.  On 

examination there are no focal neurological deficits, extraocular motions are 

intact, no vertebral body tenderness, neck has full range of motion, no 

vertebral body tenderness, no vomiting or dizziness, no vision changes, 

extraocular motions are intact, PERRLA.  Patient was placed in c-collar out of 

precaution.  CT of the brain and cervical spine shows no acute process.  X-ray 

of the ribs shows no fracture or acute abnormality.  Patient appears stable for 

discharge with outpatient follow-up at this time. Follow-up with PCP.  Report 

back to ER with any new or worsening symptoms.  Discussed return parameters and 

answered all questions.  Patient conveyed verbal understanding and agreed to the

plan.  I discussed this case in detail with my attending Dr. Darden. 








Disposition


Clinical Impression: 


 Head injury





Disposition: HOME SELF-CARE


Condition: Good


Instructions (If sedation given, give patient instructions):  Concussion (ED), 

Head Injury (ED), Post Concussion Syndrome (ED)


Additional Instructions: 


Follow-up with PCP this week.  Report back to ER if any new or worsening 

symptoms.


Is patient prescribed a controlled substance at d/c from ED?: No


Referrals: 


Ross Oates Jr,  [Primary Care Provider] - 1-2 days


Time of Disposition: 15:50

## 2022-09-03 NOTE — XR
Bilateral RIBS

 

HISTORY: Trauma.

 

COMPARISON: None.

 

TECHNIQUE: 8 views of the ribs were obtained.

 

FINDINGS:

 

There is no rib fracture or focal intraosseous abnormality. There is no pleural thickening or pleural
 effusion.

 

IMPRESSION:

 

No significant abnormality of the ribs.

## 2022-10-15 ENCOUNTER — HOSPITAL ENCOUNTER (EMERGENCY)
Dept: HOSPITAL 47 - EC | Age: 27
Discharge: HOME | End: 2022-10-15
Payer: COMMERCIAL

## 2022-10-15 VITALS
HEART RATE: 76 BPM | SYSTOLIC BLOOD PRESSURE: 130 MMHG | RESPIRATION RATE: 18 BRPM | DIASTOLIC BLOOD PRESSURE: 82 MMHG | TEMPERATURE: 98.8 F

## 2022-10-15 DIAGNOSIS — K29.70: Primary | ICD-10-CM

## 2022-10-15 DIAGNOSIS — E07.9: ICD-10-CM

## 2022-10-15 DIAGNOSIS — Z79.51: ICD-10-CM

## 2022-10-15 DIAGNOSIS — J45.909: ICD-10-CM

## 2022-10-15 DIAGNOSIS — F17.290: ICD-10-CM

## 2022-10-15 DIAGNOSIS — D59.30: ICD-10-CM

## 2022-10-15 PROCEDURE — 99283 EMERGENCY DEPT VISIT LOW MDM: CPT

## 2022-10-15 NOTE — ED
Nausea/Vomiting/Diarrhea HPI





- General


Chief complaint: Nausea/Vomiting/Diarrhea


Stated complaint: Dehydrated


Time Seen by Provider: 10/15/22 16:33


Source: patient, RN notes reviewed


Mode of arrival: ambulatory


Limitations: no limitations





- History of Present Illness


Initial comments: 





This is a pleasant 27-year-old male comes ER requesting a work note.  Patient 

states he drank too much alcohol last night was vomiting this morning.  Patient 

was unable to go to work.  He called in sick.  Patient states he feels much 

better at this time.  No nausea, no pain.





No headache, no fever or chills, no changes in vision or hearing, no sore throat

or difficulty with speech, no neck pain, no chest pain or shortness of breath, 

no abdominal pain, no nausea or vomiting, no changes in urination or bowel 

movements, no numbness or tingling, no extremity pain, no skin rashes or 

lesions.





Past medical, surgical, social, and family history reviewed.


MD complaint: nausea, vomiting





- Related Data


                                Home Medications











 Medication  Instructions  Recorded  Confirmed


 


Ibuprofen [Motrin Ib] 600 mg PO TID PRN 06/04/18 06/08/19








                                  Previous Rx's











 Medication  Instructions  Recorded


 


Naproxen [EC-Naproxen] 500 mg PO BID #20 tablet. 06/08/19


 


Amoxic-Pot Clav 875-125Mg 1 tab PO Q12HR #20 tablet 04/24/21





[Augmentin 875-125]  


 


Ciprofloxacin HCl [Cipro] 500 mg PO Q12HR #20 tablet 04/24/21


 


Albuterol Sulfate [Albuterol 1 - 2 puff PO Q4-6H #8.5 gm 01/17/22





Sulfate Hfa]  











                                    Allergies











Allergy/AdvReac Type Severity Reaction Status Date / Time


 


No Known Allergies Allergy   Verified 10/15/22 15:37














Review of Systems


ROS Statement: 


Those systems with pertinent positive or pertinent negative responses have been 

documented in the HPI.





ROS Other: All systems not noted in ROS Statement are negative.





Past Medical History


Past Medical History: Asthma, Thyroid Disorder


History of Any Multi-Drug Resistant Organisms: None Reported


Past Surgical History: No Surgical Hx Reported


Past Anesthesia/Blood Transfusion Reactions: No Reported Reaction


Past Psychological History: ADD/ADHD


Smoking Status: Vaper


Past Alcohol Use History: Occasional


Past Drug Use History: Marijuana





- Past Family History


  ** Father


Additional Family Medical History / Comment(s): hip surgery





General Exam


Limitations: no limitations


General appearance: alert, in no apparent distress


Head exam: Present: atraumatic, normocephalic, normal inspection


Eye exam: Present: normal appearance, PERRL, EOMI.  Absent: scleral icterus, 

conjunctival injection, periorbital swelling


ENT exam: Present: normal exam, mucous membranes moist


Neck exam: Present: normal inspection.  Absent: tenderness, meningismus, 

lymphadenopathy


Respiratory exam: Present: normal lung sounds bilaterally.  Absent: respiratory 

distress, wheezes, rales, rhonchi, stridor


Cardiovascular Exam: Present: regular rate, normal rhythm, normal heart sounds. 

Absent: systolic murmur, diastolic murmur, rubs, gallop, clicks


GI/Abdominal exam: Present: soft, normal bowel sounds.  Absent: distended, 

tenderness, guarding, rebound, rigid


Extremities exam: Present: normal inspection, full ROM, normal capillary refill.

 Absent: tenderness, pedal edema, joint swelling, calf tenderness


Back exam: Present: normal inspection


Neurological exam: Present: alert, oriented X3, CN II-XII intact


Psychiatric exam: Present: normal affect, normal mood


Skin exam: Present: warm, dry, intact, normal color.  Absent: rash





Course


                                   Vital Signs











  10/15/22





  15:35


 


Temperature 98.8 F


 


Pulse Rate 76


 


Respiratory 18





Rate 


 


Blood Pressure 130/82


 


O2 Sat by Pulse 98





Oximetry 














Medical Decision Making





- Medical Decision Making





Patient was told to return to the ER for any signs or symptoms worsen.  Told to 

return immediately if any other problems arise.  All questions answered.  

Treatment plan discussed.  Patient in agreement


Every effort has been made to ensure accuracy of this dictation.  However, due 

to the limitations of electronic medical records and dictation devices, errors 

in charting still occur.





Supervisor Dr. Sofia





Disposition


Clinical Impression: 


 Gastritis, Hemolytic-uremic syndrome





Disposition: HOME SELF-CARE


Condition: Good


Instructions (If sedation given, give patient instructions):  Acute Nausea and 

Vomiting (ED)


Additional Instructions: 


Follow-up with your regular physician as directed.  Return to the ER immediately

if any symptoms worsen, new symptoms arise, or any other problems develop.


Is patient prescribed a controlled substance at d/c from ED?: No


Referrals: 


Ross Oates Jr, DO [Doctor of Osteopathic Medicine] - 10/21/22


Time of Disposition: 16:47 Initiate Treatment: Recommended moisturizing after showering with Cerave cream to help with dry skin and itching. Samples Given: Cerave Itch Relief Detail Level: Simple

## 2024-09-26 ENCOUNTER — HOSPITAL ENCOUNTER (EMERGENCY)
Dept: HOSPITAL 47 - EC | Age: 29
Discharge: OTHER INSTITUTION | End: 2024-09-26
Payer: COMMERCIAL

## 2024-09-26 LAB
ALBUMIN SERPL-MCNC: 4.3 G/DL (ref 3.5–5)
ALP SERPL-CCNC: 52 U/L (ref 38–126)
ALT SERPL-CCNC: 114 U/L (ref 4–49)
ANION GAP SERPL CALC-SCNC: 13 MMOL/L
APTT BLD: 31.8 SEC (ref 22–30)
AST SERPL-CCNC: 203 U/L (ref 17–59)
BUN SERPL-SCNC: 8 MG/DL (ref 9–20)
CALCIUM SPEC-MCNC: 8.9 MG/DL (ref 8.4–10.2)
CELLS COUNTED: 100
CHLORIDE SERPL-SCNC: 108 MMOL/L (ref 98–107)
CO2 SERPL-SCNC: 17 MMOL/L (ref 22–30)
EOSINOPHIL # BLD MANUAL: 0.09 K/UL (ref 0–0.7)
ERYTHROCYTE [DISTWIDTH] IN BLOOD BY AUTOMATED COUNT: 4.34 M/UL (ref 4.3–5.9)
ERYTHROCYTE [DISTWIDTH] IN BLOOD: 11.9 % (ref 11.5–15.5)
GLUCOSE SERPL-MCNC: 248 MG/DL (ref 74–99)
HCT VFR BLD AUTO: 44.7 % (ref 39–53)
HGB BLD-MCNC: 14.1 GM/DL (ref 13–17.5)
HYALINE CASTS UR QL AUTO: 2 /LPF (ref 0–2)
INR PPP: 1.1 (ref ?–1.2)
LYMPHOCYTES # BLD MANUAL: 4.91 K/UL (ref 1–4.8)
MCH RBC QN AUTO: 32.5 PG (ref 25–35)
MCHC RBC AUTO-ENTMCNC: 31.5 G/DL (ref 31–37)
MCV RBC AUTO: 103 FL (ref 80–100)
MONOCYTES # BLD MANUAL: 0.55 K/UL (ref 0–1)
NEUTROPHILS NFR BLD MANUAL: 39 %
NEUTS SEG # BLD MANUAL: 3.55 K/UL (ref 1.3–7.7)
PH UR: 7 [PH] (ref 5–8)
PLATELET # BLD AUTO: 251 K/UL (ref 150–450)
POTASSIUM SERPL-SCNC: 3.8 MMOL/L (ref 3.5–5.1)
PROT SERPL-MCNC: 6.6 G/DL (ref 6.3–8.2)
PT BLD: 12 SEC (ref 10–12.5)
RBC UR QL: 7 /HPF (ref 0–5)
SODIUM SERPL-SCNC: 138 MMOL/L (ref 137–145)
SP GR UR: 1.02 (ref 1–1.03)
UROBILINOGEN UR QL STRIP: <2 MG/DL (ref ?–2)
WBC # BLD AUTO: 9.1 K/UL (ref 3.8–10.6)
WBC #/AREA URNS HPF: 3 /HPF (ref 0–5)

## 2024-09-26 PROCEDURE — 96375 TX/PRO/DX INJ NEW DRUG ADDON: CPT

## 2024-09-26 PROCEDURE — 36415 COLL VENOUS BLD VENIPUNCTURE: CPT

## 2024-09-26 PROCEDURE — 72170 X-RAY EXAM OF PELVIS: CPT

## 2024-09-26 PROCEDURE — 84484 ASSAY OF TROPONIN QUANT: CPT

## 2024-09-26 PROCEDURE — 86850 RBC ANTIBODY SCREEN: CPT

## 2024-09-26 PROCEDURE — 32551 INSERTION OF CHEST TUBE: CPT

## 2024-09-26 PROCEDURE — 36556 INSERT NON-TUNNEL CV CATH: CPT

## 2024-09-26 PROCEDURE — 80320 DRUG SCREEN QUANTALCOHOLS: CPT

## 2024-09-26 PROCEDURE — 86920 COMPATIBILITY TEST SPIN: CPT

## 2024-09-26 PROCEDURE — 70486 CT MAXILLOFACIAL W/O DYE: CPT

## 2024-09-26 PROCEDURE — 85610 PROTHROMBIN TIME: CPT

## 2024-09-26 PROCEDURE — 72125 CT NECK SPINE W/O DYE: CPT

## 2024-09-26 PROCEDURE — 86900 BLOOD TYPING SEROLOGIC ABO: CPT

## 2024-09-26 PROCEDURE — 71260 CT THORAX DX C+: CPT

## 2024-09-26 PROCEDURE — 80053 COMPREHEN METABOLIC PANEL: CPT

## 2024-09-26 PROCEDURE — 86901 BLOOD TYPING SEROLOGIC RH(D): CPT

## 2024-09-26 PROCEDURE — 31500 INSERT EMERGENCY AIRWAY: CPT

## 2024-09-26 PROCEDURE — 80306 DRUG TEST PRSMV INSTRMNT: CPT

## 2024-09-26 PROCEDURE — 90471 IMMUNIZATION ADMIN: CPT

## 2024-09-26 PROCEDURE — 99291 CRITICAL CARE FIRST HOUR: CPT

## 2024-09-26 PROCEDURE — 96365 THER/PROPH/DIAG IV INF INIT: CPT

## 2024-09-26 PROCEDURE — 74177 CT ABD & PELVIS W/CONTRAST: CPT

## 2024-09-26 PROCEDURE — 81001 URINALYSIS AUTO W/SCOPE: CPT

## 2024-09-26 PROCEDURE — 85025 COMPLETE CBC W/AUTO DIFF WBC: CPT

## 2024-09-26 PROCEDURE — 70450 CT HEAD/BRAIN W/O DYE: CPT

## 2024-09-26 PROCEDURE — 85730 THROMBOPLASTIN TIME PARTIAL: CPT

## 2024-09-26 PROCEDURE — 71045 X-RAY EXAM CHEST 1 VIEW: CPT

## 2024-09-26 PROCEDURE — 90715 TDAP VACCINE 7 YRS/> IM: CPT

## 2024-09-26 RX ADMIN — KETAMINE HYDROCHLORIDE STA MG: 10 INJECTION, SOLUTION INTRAMUSCULAR; INTRAVENOUS at 07:39

## 2024-09-26 RX ADMIN — SUCCINYLCHOLINE CHLORIDE STA MG: 20 INJECTION, SOLUTION INTRAMUSCULAR; INTRAVENOUS at 06:58

## 2024-09-26 RX ADMIN — MIDAZOLAM HYDROCHLORIDE STA MG: 1 INJECTION, SOLUTION INTRAMUSCULAR; INTRAVENOUS at 06:56

## 2024-09-26 RX ADMIN — TETANUS TOXOID, REDUCED DIPHTHERIA TOXOID AND ACELLULAR PERTUSSIS VACCINE, ADSORBED ONE ML: 5; 2.5; 8; 8; 2.5 SUSPENSION INTRAMUSCULAR at 07:44

## 2024-09-26 RX ADMIN — FENTANYL CITRATE STA MCG: 50 INJECTION, SOLUTION INTRAMUSCULAR; INTRAVENOUS at 08:23

## 2024-09-26 RX ADMIN — TRANEXAMIC ACID STA MLS/HR: 10 INJECTION, SOLUTION INTRAVENOUS at 07:47
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
